# Patient Record
Sex: FEMALE | Race: WHITE
[De-identification: names, ages, dates, MRNs, and addresses within clinical notes are randomized per-mention and may not be internally consistent; named-entity substitution may affect disease eponyms.]

---

## 2017-11-29 ENCOUNTER — TRANSCRIPTION ENCOUNTER (OUTPATIENT)
Age: 82
End: 2017-11-29

## 2017-12-11 ENCOUNTER — EMERGENCY (EMERGENCY)
Facility: HOSPITAL | Age: 82
LOS: 1 days | Discharge: HOME | End: 2017-12-11
Attending: EMERGENCY MEDICINE | Admitting: EMERGENCY MEDICINE
Payer: MEDICARE

## 2017-12-11 VITALS
HEART RATE: 73 BPM | WEIGHT: 108.03 LBS | HEIGHT: 63 IN | DIASTOLIC BLOOD PRESSURE: 73 MMHG | RESPIRATION RATE: 16 BRPM | SYSTOLIC BLOOD PRESSURE: 210 MMHG | OXYGEN SATURATION: 100 % | TEMPERATURE: 99 F

## 2017-12-11 VITALS
DIASTOLIC BLOOD PRESSURE: 56 MMHG | TEMPERATURE: 98 F | HEART RATE: 65 BPM | OXYGEN SATURATION: 100 % | SYSTOLIC BLOOD PRESSURE: 145 MMHG | RESPIRATION RATE: 14 BRPM

## 2017-12-11 DIAGNOSIS — Z95.0 PRESENCE OF CARDIAC PACEMAKER: Chronic | ICD-10-CM

## 2017-12-11 PROCEDURE — 99284 EMERGENCY DEPT VISIT MOD MDM: CPT

## 2017-12-11 PROCEDURE — 99283 EMERGENCY DEPT VISIT LOW MDM: CPT

## 2020-01-08 ENCOUNTER — EMERGENCY (EMERGENCY)
Facility: HOSPITAL | Age: 85
LOS: 0 days | Discharge: HOME | End: 2020-01-08
Attending: EMERGENCY MEDICINE
Payer: MEDICARE

## 2020-01-08 ENCOUNTER — TRANSCRIPTION ENCOUNTER (OUTPATIENT)
Age: 85
End: 2020-01-08

## 2020-01-08 VITALS — SYSTOLIC BLOOD PRESSURE: 156 MMHG | HEART RATE: 74 BPM | DIASTOLIC BLOOD PRESSURE: 67 MMHG

## 2020-01-08 VITALS — HEIGHT: 63 IN | WEIGHT: 108.03 LBS

## 2020-01-08 DIAGNOSIS — R19.7 DIARRHEA, UNSPECIFIED: ICD-10-CM

## 2020-01-08 DIAGNOSIS — I10 ESSENTIAL (PRIMARY) HYPERTENSION: ICD-10-CM

## 2020-01-08 DIAGNOSIS — Z95.0 PRESENCE OF CARDIAC PACEMAKER: Chronic | ICD-10-CM

## 2020-01-08 LAB
ALBUMIN SERPL ELPH-MCNC: 4.2 G/DL — SIGNIFICANT CHANGE UP (ref 3.3–5)
ALP SERPL-CCNC: 63 U/L — SIGNIFICANT CHANGE UP (ref 40–120)
ALT FLD-CCNC: 42 U/L — SIGNIFICANT CHANGE UP (ref 12–78)
ANION GAP SERPL CALC-SCNC: 11 MMOL/L — SIGNIFICANT CHANGE UP (ref 5–17)
APTT BLD: 28.2 SEC — SIGNIFICANT CHANGE UP (ref 27.5–36.3)
AST SERPL-CCNC: 34 U/L — SIGNIFICANT CHANGE UP (ref 15–37)
BASOPHILS # BLD AUTO: 0.04 K/UL — SIGNIFICANT CHANGE UP (ref 0–0.2)
BASOPHILS NFR BLD AUTO: 0.3 % — SIGNIFICANT CHANGE UP (ref 0–2)
BILIRUB SERPL-MCNC: 0.9 MG/DL — SIGNIFICANT CHANGE UP (ref 0.2–1.2)
BUN SERPL-MCNC: 64 MG/DL — HIGH (ref 7–23)
CALCIUM SERPL-MCNC: 9.3 MG/DL — SIGNIFICANT CHANGE UP (ref 8.5–10.1)
CHLORIDE SERPL-SCNC: 107 MMOL/L — SIGNIFICANT CHANGE UP (ref 96–108)
CO2 SERPL-SCNC: 19 MMOL/L — LOW (ref 22–31)
CREAT SERPL-MCNC: 1.89 MG/DL — HIGH (ref 0.5–1.3)
EOSINOPHIL # BLD AUTO: 0.1 K/UL — SIGNIFICANT CHANGE UP (ref 0–0.5)
EOSINOPHIL NFR BLD AUTO: 0.7 % — SIGNIFICANT CHANGE UP (ref 0–6)
GLUCOSE SERPL-MCNC: 171 MG/DL — HIGH (ref 70–99)
HCT VFR BLD CALC: 45.2 % — HIGH (ref 34.5–45)
HGB BLD-MCNC: 14.7 G/DL — SIGNIFICANT CHANGE UP (ref 11.5–15.5)
IMM GRANULOCYTES NFR BLD AUTO: 0.3 % — SIGNIFICANT CHANGE UP (ref 0–1.5)
INR BLD: 1.09 RATIO — SIGNIFICANT CHANGE UP (ref 0.88–1.16)
LIDOCAIN IGE QN: 167 U/L — SIGNIFICANT CHANGE UP (ref 73–393)
LYMPHOCYTES # BLD AUTO: 24.8 % — SIGNIFICANT CHANGE UP (ref 13–44)
LYMPHOCYTES # BLD AUTO: 3.62 K/UL — HIGH (ref 1–3.3)
MCHC RBC-ENTMCNC: 31.1 PG — SIGNIFICANT CHANGE UP (ref 27–34)
MCHC RBC-ENTMCNC: 32.5 GM/DL — SIGNIFICANT CHANGE UP (ref 32–36)
MCV RBC AUTO: 95.6 FL — SIGNIFICANT CHANGE UP (ref 80–100)
MONOCYTES # BLD AUTO: 0.95 K/UL — HIGH (ref 0–0.9)
MONOCYTES NFR BLD AUTO: 6.5 % — SIGNIFICANT CHANGE UP (ref 2–14)
NEUTROPHILS # BLD AUTO: 9.81 K/UL — HIGH (ref 1.8–7.4)
NEUTROPHILS NFR BLD AUTO: 67.4 % — SIGNIFICANT CHANGE UP (ref 43–77)
PLATELET # BLD AUTO: 223 K/UL — SIGNIFICANT CHANGE UP (ref 150–400)
POTASSIUM SERPL-MCNC: 4 MMOL/L — SIGNIFICANT CHANGE UP (ref 3.5–5.3)
POTASSIUM SERPL-SCNC: 4 MMOL/L — SIGNIFICANT CHANGE UP (ref 3.5–5.3)
PROT SERPL-MCNC: 8.9 GM/DL — HIGH (ref 6–8.3)
PROTHROM AB SERPL-ACNC: 12.1 SEC — SIGNIFICANT CHANGE UP (ref 10–12.9)
RBC # BLD: 4.73 M/UL — SIGNIFICANT CHANGE UP (ref 3.8–5.2)
RBC # FLD: 12.6 % — SIGNIFICANT CHANGE UP (ref 10.3–14.5)
SODIUM SERPL-SCNC: 137 MMOL/L — SIGNIFICANT CHANGE UP (ref 135–145)
WBC # BLD: 14.57 K/UL — HIGH (ref 3.8–10.5)
WBC # FLD AUTO: 14.57 K/UL — HIGH (ref 3.8–10.5)

## 2020-01-08 PROCEDURE — 80053 COMPREHEN METABOLIC PANEL: CPT

## 2020-01-08 PROCEDURE — 85610 PROTHROMBIN TIME: CPT

## 2020-01-08 PROCEDURE — 85025 COMPLETE CBC W/AUTO DIFF WBC: CPT

## 2020-01-08 PROCEDURE — 36415 COLL VENOUS BLD VENIPUNCTURE: CPT

## 2020-01-08 PROCEDURE — 83690 ASSAY OF LIPASE: CPT

## 2020-01-08 PROCEDURE — 74176 CT ABD & PELVIS W/O CONTRAST: CPT

## 2020-01-08 PROCEDURE — 99284 EMERGENCY DEPT VISIT MOD MDM: CPT

## 2020-01-08 PROCEDURE — 93005 ELECTROCARDIOGRAM TRACING: CPT

## 2020-01-08 PROCEDURE — 99284 EMERGENCY DEPT VISIT MOD MDM: CPT | Mod: 25

## 2020-01-08 PROCEDURE — 85730 THROMBOPLASTIN TIME PARTIAL: CPT

## 2020-01-08 PROCEDURE — 93010 ELECTROCARDIOGRAM REPORT: CPT

## 2020-01-08 PROCEDURE — 74176 CT ABD & PELVIS W/O CONTRAST: CPT | Mod: 26

## 2020-01-08 RX ORDER — SODIUM CHLORIDE 9 MG/ML
1000 INJECTION INTRAMUSCULAR; INTRAVENOUS; SUBCUTANEOUS ONCE
Refills: 0 | Status: COMPLETED | OUTPATIENT
Start: 2020-01-08 | End: 2020-01-08

## 2020-01-08 RX ORDER — METOPROLOL TARTRATE 50 MG
50 TABLET ORAL ONCE
Refills: 0 | Status: COMPLETED | OUTPATIENT
Start: 2020-01-08 | End: 2020-01-08

## 2020-01-08 RX ORDER — LOSARTAN POTASSIUM 100 MG/1
100 TABLET, FILM COATED ORAL ONCE
Refills: 0 | Status: COMPLETED | OUTPATIENT
Start: 2020-01-08 | End: 2020-01-08

## 2020-01-08 RX ADMIN — LOSARTAN POTASSIUM 100 MILLIGRAM(S): 100 TABLET, FILM COATED ORAL at 16:45

## 2020-01-08 RX ADMIN — Medication 50 MILLIGRAM(S): at 16:44

## 2020-01-08 RX ADMIN — SODIUM CHLORIDE 1000 MILLILITER(S): 9 INJECTION INTRAMUSCULAR; INTRAVENOUS; SUBCUTANEOUS at 16:46

## 2020-01-08 NOTE — ED ADULT TRIAGE NOTE - CHIEF COMPLAINT QUOTE
c/o diarrhea x2day w/ decreased PO intake. denies bad pain, n/v, and fevers at home. sent from  for IV hydration w/ gastroenteritis diagnosis

## 2020-01-08 NOTE — ED STATDOCS - PATIENT PORTAL LINK FT
You can access the FollowMyHealth Patient Portal offered by [facility] by registering at the following website: [email or web address]. By joining [employer]’s FollowMyHealth portal, you will also be able to view your health information using other applications (apps) compatible with our system.

## 2020-01-08 NOTE — ED STATDOCS - NSFOLLOWUPINSTRUCTIONS_ED_ALL_ED_FT
Drink plenty of water to keep yourself hydrated and continue to take your medication for your blood pressure.     Return to the ER for any new or other concerns.      Acute Diarrhea    WHAT YOU NEED TO KNOW:    Acute diarrhea starts quickly and lasts a short time, usually 1 to 3 days. It can last up to 2 weeks. You may not be able to control your diarrhea. Acute diarrhea usually stops on its own.     DISCHARGE INSTRUCTIONS:    Return to the emergency department if:     You feel confused.       Your heartbeat is faster than usual.       Your eyes look deeply sunken, or you have no tears when you cry.       You urinate less than usual, or your urine is dark yellow.       You have blood or mucus in your bowel movements.      You have severe abdominal pain.       You are unable to drink any liquids.     Contact your healthcare provider if:     Your symptoms do not get better with treatment.       You have a fever higher than 101.3°F (38.5°C).       You have trouble eating and drinking because you are vomiting.       Your diarrhea does not get better in 7 days.       You have questions or concerns about your condition or care.     Follow up with your healthcare provider as directed: Write down your questions so you remember to ask them during your visits.     Medicines:    Diarrhea medicine is an over-the-counter medicine that helps slow or stop your diarrhea. Do not take this medicine unless your healthcare provider says it is okay.       Antibiotics may be given to help treat an infection caused by bacteria.       Antiparasitics may be given to treat an infection caused by parasites.       Take your medicine as directed. Contact your healthcare provider if you think your medicine is not helping or if you have side effects. Tell him of her if you are allergic to any medicine. Keep a list of the medicines, vitamins, and herbs you take. Include the amounts, and when and why you take them. Bring the list or the pill bottles to follow-up visits. Carry your medicine list with you in case of an emergency.    Self-care:     Drink liquids as directed. Liquids will help prevent dehydration caused by diarrhea. Ask your healthcare provider how much liquid to drink each day and which liquids are best for you. You may need to drink an oral rehydration solution (ORS). An ORS has the right amounts of water, salts, and sugar you need to replace body fluids. You can buy an ORS at most grocery stores and pharmacies.       Eat foods that are easy to digest. Examples include rice, lentils, cereal, bananas, potatoes, and bread. It also includes some fruits (bananas, melon), well-cooked vegetables, and lean meats. Do not eat foods high in fiber, fat, and sugar. Do not drink alcohol until your diarrhea is gone.     Prevent acute diarrhea:     Wash your hands often. Use soap and water. Wash your hands before you eat or prepare food. Also wash your hands after you use the bathroom. Use an alcohol-based hand gel when soap and water are not available. Handwashing           Keep bathroom surfaces clean. This helps prevent the spread of germs that cause acute diarrhea.       Wash fruits and vegetables well before you eat them. This can help remove germs that cause diarrhea. If possible, remove the skin from fruits and vegetables, or cook them well before you eat them.       Cook meat and poultry as directed. Meat includes beef and pork. Poultry includes chicken, turkey, and duck.  Cook ground meat to 160°F.       Cook ground poultry, whole poultry, or cuts of poultry to at least 165°F. Remove the poultry from heat. Let it stand for 3 minutes before you eat it.       Cook whole cuts of meat other than poultry to at least 145°F. Remove the meat from heat. Let it stand for 3 minutes before you eat it.       Wash dishes that have touched raw meat or poultry with hot water and soap. This includes cutting boards, utensils, dishes, and serving containers.       Place raw or cooked meat or poultry in the refrigerator as soon as possible. Bacteria can grow in meat or poultry that is left at room temperature too long.       Do not eat raw or undercooked oysters, clams, or mussels. These foods may be contaminated and cause infection.       Drink only filtered or treated water when you travel. Do not put ice in your drinks. Drink bottled water whenever possible.

## 2020-01-08 NOTE — ED ADULT NURSE NOTE - NSIMPLEMENTINTERV_GEN_ALL_ED
Implemented All Universal Safety Interventions:  [place] to call system. Call bell, personal items and telephone within reach. Instruct patient to call for assistance. Room bathroom lighting operational. Non-slip footwear when patient is off stretcher. Physically safe environment: no spills, clutter or unnecessary equipment. Stretcher in lowest position, wheels locked, appropriate side rails in place.

## 2020-01-08 NOTE — ED STATDOCS - PROGRESS NOTE DETAILS
89 yo female with a PMH of htn presents with diarrhea and decreased oral intake. Pt was seen a urgent care and sent to the ER for evaluation and hydration. Denies fever, abd pain, n/v, cp, sob, visual changes, decreased urination, headache. Pt with HTN in the intake room. Pt has not taken her medication today. Will given her dose of meds and labs, IVF. -Ilir Alexander PA-C Creatinine slightly elevated. COuld be related to the diarrhea and that pt was not been having much PO intake. BP has improved from the initial reading. Pt feelign better. Discussed results with pt and with care giver. They were made aware that she needs to see her pmd to recheck her Cr and remaining labs. Per caregiver, pt has an appointment with her pmd and will advise the doctor of the plan. Strict return precautions given. -Ilir Alexander PA-C

## 2020-01-08 NOTE — ED STATDOCS - ATTENDING CONTRIBUTION TO CARE
I, Marisol Hood MD, personally saw the patient with ACP.  I have personally performed a face to face diagnostic evaluation on this patient.  I have reviewed the ACP note and agree with the history, exam, and plan of care, except as noted.

## 2020-01-08 NOTE — ED STATDOCS - OBJECTIVE STATEMENT
89 y/o F with a PMHx of HTN presents to the ED c/o diarrhea associated with a decreased PO intake. Pt was seen at  and was sent to ED for IVF with gastroenteritis diagnosis. Denies abd pain, N/V, or fever.

## 2020-01-10 ENCOUNTER — INPATIENT (INPATIENT)
Facility: HOSPITAL | Age: 85
LOS: 4 days | Discharge: HOME HEALTH | DRG: 682 | End: 2020-01-15
Attending: FAMILY MEDICINE | Admitting: FAMILY MEDICINE
Payer: MEDICARE

## 2020-01-10 VITALS — WEIGHT: 100.97 LBS | HEIGHT: 62 IN

## 2020-01-10 DIAGNOSIS — E86.0 DEHYDRATION: ICD-10-CM

## 2020-01-10 DIAGNOSIS — Z95.0 PRESENCE OF CARDIAC PACEMAKER: Chronic | ICD-10-CM

## 2020-01-10 LAB
ALBUMIN SERPL ELPH-MCNC: 3.8 G/DL — SIGNIFICANT CHANGE UP (ref 3.3–5)
ALP SERPL-CCNC: 53 U/L — SIGNIFICANT CHANGE UP (ref 40–120)
ALT FLD-CCNC: 35 U/L — SIGNIFICANT CHANGE UP (ref 12–78)
ANION GAP SERPL CALC-SCNC: 10 MMOL/L — SIGNIFICANT CHANGE UP (ref 5–17)
APPEARANCE UR: ABNORMAL
AST SERPL-CCNC: 30 U/L — SIGNIFICANT CHANGE UP (ref 15–37)
BASOPHILS # BLD AUTO: 0.06 K/UL — SIGNIFICANT CHANGE UP (ref 0–0.2)
BASOPHILS NFR BLD AUTO: 0.3 % — SIGNIFICANT CHANGE UP (ref 0–2)
BILIRUB SERPL-MCNC: 0.8 MG/DL — SIGNIFICANT CHANGE UP (ref 0.2–1.2)
BILIRUB UR-MCNC: ABNORMAL
BUN SERPL-MCNC: 74 MG/DL — HIGH (ref 7–23)
CALCIUM SERPL-MCNC: 8.9 MG/DL — SIGNIFICANT CHANGE UP (ref 8.5–10.1)
CHLORIDE SERPL-SCNC: 111 MMOL/L — HIGH (ref 96–108)
CO2 SERPL-SCNC: 17 MMOL/L — LOW (ref 22–31)
COLOR SPEC: YELLOW — SIGNIFICANT CHANGE UP
CREAT SERPL-MCNC: 2.9 MG/DL — HIGH (ref 0.5–1.3)
DIFF PNL FLD: ABNORMAL
DIGOXIN SERPL-MCNC: 1.08 NG/ML — SIGNIFICANT CHANGE UP (ref 0.8–2)
EOSINOPHIL # BLD AUTO: 0.1 K/UL — SIGNIFICANT CHANGE UP (ref 0–0.5)
EOSINOPHIL NFR BLD AUTO: 0.5 % — SIGNIFICANT CHANGE UP (ref 0–6)
GLUCOSE SERPL-MCNC: 213 MG/DL — HIGH (ref 70–99)
GLUCOSE UR QL: 100 MG/DL
HCT VFR BLD CALC: 41.8 % — SIGNIFICANT CHANGE UP (ref 34.5–45)
HGB BLD-MCNC: 14.1 G/DL — SIGNIFICANT CHANGE UP (ref 11.5–15.5)
IMM GRANULOCYTES NFR BLD AUTO: 0.6 % — SIGNIFICANT CHANGE UP (ref 0–1.5)
KETONES UR-MCNC: ABNORMAL
LEUKOCYTE ESTERASE UR-ACNC: NEGATIVE — SIGNIFICANT CHANGE UP
LYMPHOCYTES # BLD AUTO: 2.07 K/UL — SIGNIFICANT CHANGE UP (ref 1–3.3)
LYMPHOCYTES # BLD AUTO: 9.6 % — LOW (ref 13–44)
MCHC RBC-ENTMCNC: 31.4 PG — SIGNIFICANT CHANGE UP (ref 27–34)
MCHC RBC-ENTMCNC: 33.7 GM/DL — SIGNIFICANT CHANGE UP (ref 32–36)
MCV RBC AUTO: 93.1 FL — SIGNIFICANT CHANGE UP (ref 80–100)
MONOCYTES # BLD AUTO: 1.42 K/UL — HIGH (ref 0–0.9)
MONOCYTES NFR BLD AUTO: 6.6 % — SIGNIFICANT CHANGE UP (ref 2–14)
NEUTROPHILS # BLD AUTO: 17.68 K/UL — HIGH (ref 1.8–7.4)
NEUTROPHILS NFR BLD AUTO: 82.4 % — HIGH (ref 43–77)
NITRITE UR-MCNC: NEGATIVE — SIGNIFICANT CHANGE UP
PH UR: 5 — SIGNIFICANT CHANGE UP (ref 5–8)
PLATELET # BLD AUTO: 217 K/UL — SIGNIFICANT CHANGE UP (ref 150–400)
POTASSIUM SERPL-MCNC: 3.3 MMOL/L — LOW (ref 3.5–5.3)
POTASSIUM SERPL-SCNC: 3.3 MMOL/L — LOW (ref 3.5–5.3)
PROT SERPL-MCNC: 7.9 GM/DL — SIGNIFICANT CHANGE UP (ref 6–8.3)
PROT UR-MCNC: 100 MG/DL
RBC # BLD: 4.49 M/UL — SIGNIFICANT CHANGE UP (ref 3.8–5.2)
RBC # FLD: 12.8 % — SIGNIFICANT CHANGE UP (ref 10.3–14.5)
SODIUM SERPL-SCNC: 138 MMOL/L — SIGNIFICANT CHANGE UP (ref 135–145)
SP GR SPEC: 1.02 — SIGNIFICANT CHANGE UP (ref 1.01–1.02)
UROBILINOGEN FLD QL: NEGATIVE MG/DL — SIGNIFICANT CHANGE UP
WBC # BLD: 21.46 K/UL — HIGH (ref 3.8–10.5)
WBC # FLD AUTO: 21.46 K/UL — HIGH (ref 3.8–10.5)

## 2020-01-10 PROCEDURE — 92610 EVALUATE SWALLOWING FUNCTION: CPT | Mod: GN

## 2020-01-10 PROCEDURE — 82570 ASSAY OF URINE CREATININE: CPT

## 2020-01-10 PROCEDURE — 71045 X-RAY EXAM CHEST 1 VIEW: CPT | Mod: 26

## 2020-01-10 PROCEDURE — 71250 CT THORAX DX C-: CPT | Mod: 26

## 2020-01-10 PROCEDURE — 76770 US EXAM ABDO BACK WALL COMP: CPT

## 2020-01-10 PROCEDURE — 83735 ASSAY OF MAGNESIUM: CPT

## 2020-01-10 PROCEDURE — 80162 ASSAY OF DIGOXIN TOTAL: CPT

## 2020-01-10 PROCEDURE — 84100 ASSAY OF PHOSPHORUS: CPT

## 2020-01-10 PROCEDURE — 84300 ASSAY OF URINE SODIUM: CPT

## 2020-01-10 PROCEDURE — 93010 ELECTROCARDIOGRAM REPORT: CPT

## 2020-01-10 PROCEDURE — 85027 COMPLETE CBC AUTOMATED: CPT

## 2020-01-10 PROCEDURE — 80048 BASIC METABOLIC PNL TOTAL CA: CPT

## 2020-01-10 PROCEDURE — 71250 CT THORAX DX C-: CPT

## 2020-01-10 PROCEDURE — 74176 CT ABD & PELVIS W/O CONTRAST: CPT

## 2020-01-10 PROCEDURE — 76770 US EXAM ABDO BACK WALL COMP: CPT | Mod: 26

## 2020-01-10 PROCEDURE — 36415 COLL VENOUS BLD VENIPUNCTURE: CPT

## 2020-01-10 PROCEDURE — 87507 IADNA-DNA/RNA PROBE TQ 12-25: CPT

## 2020-01-10 PROCEDURE — 84156 ASSAY OF PROTEIN URINE: CPT

## 2020-01-10 RX ORDER — SODIUM CHLORIDE 9 MG/ML
1000 INJECTION INTRAMUSCULAR; INTRAVENOUS; SUBCUTANEOUS ONCE
Refills: 0 | Status: COMPLETED | OUTPATIENT
Start: 2020-01-10 | End: 2020-01-10

## 2020-01-10 RX ORDER — ESCITALOPRAM OXALATE 10 MG/1
10 TABLET, FILM COATED ORAL DAILY
Refills: 0 | Status: DISCONTINUED | OUTPATIENT
Start: 2020-01-10 | End: 2020-01-15

## 2020-01-10 RX ORDER — ONDANSETRON 8 MG/1
4 TABLET, FILM COATED ORAL EVERY 6 HOURS
Refills: 0 | Status: DISCONTINUED | OUTPATIENT
Start: 2020-01-10 | End: 2020-01-15

## 2020-01-10 RX ORDER — CEFTRIAXONE 500 MG/1
1000 INJECTION, POWDER, FOR SOLUTION INTRAMUSCULAR; INTRAVENOUS ONCE
Refills: 0 | Status: COMPLETED | OUTPATIENT
Start: 2020-01-10 | End: 2020-01-10

## 2020-01-10 RX ORDER — POTASSIUM CHLORIDE 20 MEQ
40 PACKET (EA) ORAL ONCE
Refills: 0 | Status: COMPLETED | OUTPATIENT
Start: 2020-01-10 | End: 2020-01-10

## 2020-01-10 RX ORDER — AZITHROMYCIN 500 MG/1
500 TABLET, FILM COATED ORAL ONCE
Refills: 0 | Status: COMPLETED | OUTPATIENT
Start: 2020-01-10 | End: 2020-01-10

## 2020-01-10 RX ORDER — HEPARIN SODIUM 5000 [USP'U]/ML
5000 INJECTION INTRAVENOUS; SUBCUTANEOUS EVERY 12 HOURS
Refills: 0 | Status: DISCONTINUED | OUTPATIENT
Start: 2020-01-10 | End: 2020-01-15

## 2020-01-10 RX ORDER — ATORVASTATIN CALCIUM 80 MG/1
10 TABLET, FILM COATED ORAL AT BEDTIME
Refills: 0 | Status: DISCONTINUED | OUTPATIENT
Start: 2020-01-10 | End: 2020-01-15

## 2020-01-10 RX ORDER — METOPROLOL TARTRATE 50 MG
1 TABLET ORAL
Qty: 0 | Refills: 0 | DISCHARGE

## 2020-01-10 RX ORDER — METOPROLOL TARTRATE 50 MG
100 TABLET ORAL DAILY
Refills: 0 | Status: DISCONTINUED | OUTPATIENT
Start: 2020-01-10 | End: 2020-01-15

## 2020-01-10 RX ORDER — MIRTAZAPINE 45 MG/1
15 TABLET, ORALLY DISINTEGRATING ORAL AT BEDTIME
Refills: 0 | Status: DISCONTINUED | OUTPATIENT
Start: 2020-01-10 | End: 2020-01-15

## 2020-01-10 RX ORDER — SODIUM CHLORIDE 9 MG/ML
1000 INJECTION INTRAMUSCULAR; INTRAVENOUS; SUBCUTANEOUS
Refills: 0 | Status: DISCONTINUED | OUTPATIENT
Start: 2020-01-10 | End: 2020-01-12

## 2020-01-10 RX ORDER — ACETAMINOPHEN 500 MG
650 TABLET ORAL EVERY 6 HOURS
Refills: 0 | Status: DISCONTINUED | OUTPATIENT
Start: 2020-01-10 | End: 2020-01-15

## 2020-01-10 RX ADMIN — AZITHROMYCIN 255 MILLIGRAM(S): 500 TABLET, FILM COATED ORAL at 11:19

## 2020-01-10 RX ADMIN — MIRTAZAPINE 15 MILLIGRAM(S): 45 TABLET, ORALLY DISINTEGRATING ORAL at 23:59

## 2020-01-10 RX ADMIN — Medication 0.1 MILLIGRAM(S): at 23:59

## 2020-01-10 RX ADMIN — Medication 40 MILLIEQUIVALENT(S): at 21:40

## 2020-01-10 RX ADMIN — SODIUM CHLORIDE 1000 MILLILITER(S): 9 INJECTION INTRAMUSCULAR; INTRAVENOUS; SUBCUTANEOUS at 11:00

## 2020-01-10 RX ADMIN — SODIUM CHLORIDE 1000 MILLILITER(S): 9 INJECTION INTRAMUSCULAR; INTRAVENOUS; SUBCUTANEOUS at 08:49

## 2020-01-10 RX ADMIN — ATORVASTATIN CALCIUM 10 MILLIGRAM(S): 80 TABLET, FILM COATED ORAL at 23:59

## 2020-01-10 RX ADMIN — CEFTRIAXONE 1000 MILLIGRAM(S): 500 INJECTION, POWDER, FOR SOLUTION INTRAMUSCULAR; INTRAVENOUS at 11:18

## 2020-01-10 RX ADMIN — SODIUM CHLORIDE 1000 MILLILITER(S): 9 INJECTION INTRAMUSCULAR; INTRAVENOUS; SUBCUTANEOUS at 10:45

## 2020-01-10 RX ADMIN — SODIUM CHLORIDE 80 MILLILITER(S): 9 INJECTION INTRAMUSCULAR; INTRAVENOUS; SUBCUTANEOUS at 20:24

## 2020-01-10 RX ADMIN — AZITHROMYCIN 500 MILLIGRAM(S): 500 TABLET, FILM COATED ORAL at 11:50

## 2020-01-10 RX ADMIN — SODIUM CHLORIDE 1000 MILLILITER(S): 9 INJECTION INTRAMUSCULAR; INTRAVENOUS; SUBCUTANEOUS at 12:30

## 2020-01-10 NOTE — ED ADULT NURSE NOTE - CHIEF COMPLAINT QUOTE
was seen in [facility] 2 days ago for diarrhea, states symptoms improving but has lose stool per family. c/o decreased PO intake and generalized weakness with dizziness. Denies CP or SOB.

## 2020-01-10 NOTE — ED PROVIDER NOTE - CONSTITUTIONAL, MLM
normal... thin chronically ill appearing, awake, alert, oriented to person, place, time/situation and in mild apparent distress.

## 2020-01-10 NOTE — ED PROVIDER NOTE - CLINICAL SUMMARY MEDICAL DECISION MAKING FREE TEXT BOX
89 yo female with dehydration, lethargy for 3 days. plan for ivfs, labs, ua, cxr. reEval. possible admit for continued hydration.

## 2020-01-10 NOTE — ED PROVIDER NOTE - OBJECTIVE STATEMENT
89 yo female from home with pmhx dementia, PPM,  BIB caretaker fro dec po, lethargy . was seen in ED 2 days ago for similar, also had diarrhea at that time. diarrhea started Monday, resolved yesterday ecept for 1 loose BM this am. no fevers. no abd pain,. no cp. chronic cough.

## 2020-01-10 NOTE — H&P ADULT - HISTORY OF PRESENT ILLNESS
CC:  dehydration, weakness and diarrhea.    HPI:  89F.  hx of dementia.  lives in her own home w/ 24/7 A.  HHA reports patient was in her usual state of health until 01/06, onset of watery diarrhea.  became increasingly fatigued and dehydrated.  was brought to  and referred to  ED on 01/08 for IVFs and discharged home.  patient's condition did not improve over the following 24 hours and taken back to ED today.    denied recent ABx use or hospitalization over the past 90 days.  no known sick contacts.    in ED, given 2L NS.  UA, no pyuria.  UCx obtained by ED.  CXR, chest CT, AB/pelvis CT unremarkable.  Cr 2.9.  digoxin, losartan and HCTZ listed among patient's medications.    ROS:  (+) cough (chronic), rhinorrhea.  (-) SOB, dysuria.    PAST MEDICAL & SURGICAL HISTORY:  HTN (hypertension)  Artificial pacemaker    Allergies    No Known Allergies    Intolerances    Home Medications:  atorvastatin 10 mg oral tablet: 1 tab(s) orally once a day (10 Naresh 2020 12:57)  B Complex 50 oral tablet, extended release: 1 tab(s) orally once a day (10 Naresh 2020 15:33)  cloNIDine 0.1 mg oral tablet: 1 tab(s) orally 3 times a day (10 Naresh 2020 15:33)  digoxin 125 mcg (0.125 mg) oral tablet: 1 tab(s) orally once a day (10 Naresh 2020 12:57)  escitalopram 10 mg oral tablet: 1 tab(s) orally once a day (10 Naresh 2020 15:33)  hydroCHLOROthiazide 25 mg oral tablet: 1 tab(s) orally every other day (10 Naresh 2020 15:33)  losartan 100 mg oral tablet: 1 tab(s) orally once a day (10 Naresh 2020 12:57)  metoprolol succinate 100 mg oral tablet, extended release: 1 tab(s) orally once a day (10 Naresh 2020 15:33)  mirtazapine 15 mg oral tablet: 1 tab(s) orally once a day (at bedtime) (10 Naresh 2020 15:33)  Multiple Vitamins oral tablet: 1 tab(s) orally once a day (10 Naresh 2020 15:33)    Vital Signs Last 24 Hrs  T(C): 36.8 (10 Naresh 2020 15:47), Max: 37.3 (10 Naresh 2020 10:45)  T(F): 98.3 (10 Naresh 2020 15:47), Max: 99.2 (10 Naresh 2020 10:45)  HR: 75 (10 Naresh 2020 15:47) (72 - 83)  BP: 149/60 (10 Naresh 2020 15:47) (107/41 - 149/60)  BP(mean): 89 (10 Naresh 2020 07:29) (89 - 89)  RR: 18 (10 Naresh 2020 15:47) (17 - 18)  SpO2: 98% (10 Naresh 2020 15:47) (94% - 98%)    Constitutional: NAD.   HEENT: PERRL, EOMI, MMM.  Neck: Soft and supple, No carotid bruit, No JVD  Respiratory: Breath sounds are clear bilaterally, No wheezing, rales or rhonchi  Cardiovascular: S1 and S2, regular rate and rhythm, no murmur, rub or gallop.  Gastrointestinal: Bowel Sounds present, soft, nontender, nondistended, no guarding, no rebound, no mass.  Extremities: No peripheral edema  Vascular: 2+ peripheral pulses  Neurological: A/O x , no focal deficits  Musculoskeletal: 5/5 strength b/l upper and lower extremities  Skin:  no visible rashes.                           14.1   21.46 )-----------( 217      ( 10 Naresh 2020 08:50 )             41.8     01-10    138  |  111<H>  |  74<H>  ----------------------------<  213<H>  3.3<L>   |  17<L>  |  2.90<H>    Ca    8.9      10 Naresh 2020 08:50    TPro  7.9  /  Alb  3.8  /  TBili  0.8  /  DBili  x   /  AST  30  /  ALT  35  /  AlkPhos  53  01-10    Urine Microscopic-Add On (NC) (01.10.20 @ 08:51)    Red Blood Cell - Urine: 0-2 /HPF    White Blood Cell - Urine: Negative    Comment - Urine: amorphous    < from: CT Chest No Cont (01.10.20 @ 15:03) >  IMPRESSION:     Fibrotic strands in both apices. Mild dependent atelectasis at both lung bases. No evidence of pulmonary infiltrate or consolidation.    < end of copied text >    < from: CT Abdomen and Pelvis No Cont (01.08.20 @ 17:25) >    IMPRESSION: No acute abdominal pathology within the limits of this noncontrast study. Semiformed stool in the colon..    < end of copied text >    < from: 12 Lead ECG (01.08.20 @ 16:06) >    Diagnosis Line Sinus rhythm with 1st degree A-V block  Inferior infarct , age undetermined  ST & T wave abnormality, consider lateral ischemia  Abnormal ECG  No previous ECGs available  Confirmed by Hussein OSEGUERA, Lisandro ([id number]) on 1/8/2020 5:23:49 PM    < end of copied text > CC:  dehydration, weakness and diarrhea.    HPI:  89F.  hx of dementia.  lives in her own home w/ 24/7 A.  HHA reports patient was in her usual state of health until 01/06, onset of watery diarrhea.  became increasingly fatigued and dehydrated.  was brought to  and referred to  ED on 01/08 for IVF, dx'd w/ gastroenteritis and discharged home.  patient's condition did not improve over the following 24 hours and taken back to ED today.    denied recent ABx use or hospitalization over the past 90 days.  no known sick contacts.    in ED, given 2L NS.  UA, no pyuria.  UCx obtained by ED.  CXR, chest CT, AB/pelvis CT unremarkable.  Cr 2.9.  digoxin, losartan and HCTZ listed among patient's medications.    ROS:  (+) cough (chronic), rhinorrhea.  (-) SOB, dysuria.    PAST MEDICAL & SURGICAL HISTORY:  HTN (hypertension)  Artificial pacemaker    Allergies    No Known Allergies    Intolerances    Home Medications:  atorvastatin 10 mg oral tablet: 1 tab(s) orally once a day (10 Naresh 2020 12:57)  B Complex 50 oral tablet, extended release: 1 tab(s) orally once a day (10 Naresh 2020 15:33)  cloNIDine 0.1 mg oral tablet: 1 tab(s) orally 3 times a day (10 Naresh 2020 15:33)  digoxin 125 mcg (0.125 mg) oral tablet: 1 tab(s) orally once a day (10 Naresh 2020 12:57)  escitalopram 10 mg oral tablet: 1 tab(s) orally once a day (10 Naresh 2020 15:33)  hydroCHLOROthiazide 25 mg oral tablet: 1 tab(s) orally every other day (10 Naresh 2020 15:33)  losartan 100 mg oral tablet: 1 tab(s) orally once a day (10 Naresh 2020 12:57)  metoprolol succinate 100 mg oral tablet, extended release: 1 tab(s) orally once a day (10 Naresh 2020 15:33)  mirtazapine 15 mg oral tablet: 1 tab(s) orally once a day (at bedtime) (10 Naersh 2020 15:33)  Multiple Vitamins oral tablet: 1 tab(s) orally once a day (10 Naresh 2020 15:33)    Vital Signs Last 24 Hrs  T(C): 36.8 (10 Naresh 2020 15:47), Max: 37.3 (10 Naresh 2020 10:45)  T(F): 98.3 (10 Naresh 2020 15:47), Max: 99.2 (10 Naresh 2020 10:45)  HR: 75 (10 Naresh 2020 15:47) (72 - 83)  BP: 149/60 (10 Naresh 2020 15:47) (107/41 - 149/60)  BP(mean): 89 (10 Naresh 2020 07:29) (89 - 89)  RR: 18 (10 Naresh 2020 15:47) (17 - 18)  SpO2: 98% (10 Naresh 2020 15:47) (94% - 98%)    Constitutional: NAD.   HEENT: PERRL, EOMI, MMM.  Neck: Soft and supple, No carotid bruit, No JVD  Respiratory: Breath sounds are clear bilaterally, No wheezing, rales or rhonchi  Cardiovascular: S1 and S2, regular rate and rhythm, no murmur, rub or gallop.  Gastrointestinal: Bowel Sounds present, soft, nontender, nondistended, no guarding, no rebound, no mass.  Extremities: No peripheral edema  Vascular: 2+ peripheral pulses  Neurological: A/O x 2, no focal deficits  Musculoskeletal: 5/5 strength b/l upper and lower extremities  Skin:  no visible rashes.                         14.1   21.46 )-----------( 217      ( 10 Naresh 2020 08:50 )             41.8     01-10    138  |  111<H>  |  74<H>  ----------------------------<  213<H>  3.3<L>   |  17<L>  |  2.90<H>    Ca    8.9      10 Naresh 2020 08:50    TPro  7.9  /  Alb  3.8  /  TBili  0.8  /  DBili  x   /  AST  30  /  ALT  35  /  AlkPhos  53  01-10    Urine Microscopic-Add On (NC) (01.10.20 @ 08:51)    Red Blood Cell - Urine: 0-2 /HPF    White Blood Cell - Urine: Negative    Comment - Urine: amorphous    < from: CT Chest No Cont (01.10.20 @ 15:03) >  IMPRESSION:     Fibrotic strands in both apices. Mild dependent atelectasis at both lung bases. No evidence of pulmonary infiltrate or consolidation.    < end of copied text >    < from: CT Abdomen and Pelvis No Cont (01.08.20 @ 17:25) >    IMPRESSION: No acute abdominal pathology within the limits of this noncontrast study. Semiformed stool in the colon..    < end of copied text >    < from: 12 Lead ECG (01.08.20 @ 16:06) >    Diagnosis Line Sinus rhythm with 1st degree A-V block  Inferior infarct , age undetermined  ST & T wave abnormality, consider lateral ischemia  Abnormal ECG  No previous ECGs available  Confirmed by Lisandro Savage MD ([id number]) on 1/8/2020 5:23:49 PM    < end of copied text >

## 2020-01-10 NOTE — H&P ADULT - ASSESSMENT
89F.  hx dementia.  presented to ED c/o 5 day hx of dehydration, weakness and diarrhea.    1.	diarrhea, r/o viral v. bacterial enteritis.  stool Cx, O&P and C. dif PCR.  2.	ERICA.  prerenal azotemia/dehydration.  monitor urine output.  trend BMP.  UA bland.  US kidney and bladder.  IVFs.  DC digoxin, losartan and HCTZ.  stat digoxin level and place on telemetry monitor until resulted.  Nephrology consult.    3.	leukocytosis.  suspect hemoconcentrated, r/o C. dif.  afebrile.  no pyuria.  CXR/chest CT, no pneumonia.  CT AB/pelvis, no infectious findings.  symptoms not c/w influenza or RV.  trend CBC.  4.	hypokalemia.  monitor/supplement.  5.	debility/weakness, PT evaluation.  6.	DVT prophylaxis, UFH sq.  7.	disposition, telemetry monitoring pending digoxin level.  8.	communication, RN, JENNIFER and patient's daughter.

## 2020-01-10 NOTE — ED ADULT TRIAGE NOTE - CHIEF COMPLAINT QUOTE
was seen in [facility] 2 days ago for diarrhea, states symptoms improving but has lose stool per family. c/o decreased PO intake and generalized weakness with dizziness. Denies CP or SOB.

## 2020-01-10 NOTE — ED ADULT NURSE NOTE - NSIMPLEMENTINTERV_GEN_ALL_ED
Implemented All Fall Risk Interventions:  [place] to call system. Call bell, personal items and telephone within reach. Instruct patient to call for assistance. Room bathroom lighting operational. Non-slip footwear when patient is off stretcher. Physically safe environment: no spills, clutter or unnecessary equipment. Stretcher in lowest position, wheels locked, appropriate side rails in place. Provide visual cue, wrist band, yellow gown, etc. Monitor gait and stability. Monitor for mental status changes and reorient to person, place, and time. Review medications for side effects contributing to fall risk. Reinforce activity limits and safety measures with patient and family.

## 2020-01-10 NOTE — ED ADULT NURSE NOTE - OBJECTIVE STATEMENT
Pt to triage, A&O x3, c/o weakness.  Pts caregiver at bedside reports pt was seen in ED 2 days ago for diarrhea, reports diarrhea has subsided since then expect for one loose stool this morning but pt is endorsing weakness, decreased PO intake and fatigue.  Pt denies SOB, chest pain, N/V.  Hx of pacemaker, dementia.  Pt is poor historian due to baseline dementia.

## 2020-01-11 LAB
ANION GAP SERPL CALC-SCNC: 7 MMOL/L — SIGNIFICANT CHANGE UP (ref 5–17)
BUN SERPL-MCNC: 50 MG/DL — HIGH (ref 7–23)
C DIFF BY PCR RESULT: SIGNIFICANT CHANGE UP
C DIFF TOX GENS STL QL NAA+PROBE: SIGNIFICANT CHANGE UP
CALCIUM SERPL-MCNC: 7.8 MG/DL — LOW (ref 8.5–10.1)
CHLORIDE SERPL-SCNC: 121 MMOL/L — HIGH (ref 96–108)
CO2 SERPL-SCNC: 18 MMOL/L — LOW (ref 22–31)
CREAT ?TM UR-MCNC: 80 MG/DL — SIGNIFICANT CHANGE UP
CREAT SERPL-MCNC: 1.39 MG/DL — HIGH (ref 0.5–1.3)
CULTURE RESULTS: NO GROWTH — SIGNIFICANT CHANGE UP
GLUCOSE SERPL-MCNC: 138 MG/DL — HIGH (ref 70–99)
HCT VFR BLD CALC: 33.9 % — LOW (ref 34.5–45)
HGB BLD-MCNC: 11.3 G/DL — LOW (ref 11.5–15.5)
MCHC RBC-ENTMCNC: 31.4 PG — SIGNIFICANT CHANGE UP (ref 27–34)
MCHC RBC-ENTMCNC: 33.3 GM/DL — SIGNIFICANT CHANGE UP (ref 32–36)
MCV RBC AUTO: 94.2 FL — SIGNIFICANT CHANGE UP (ref 80–100)
PLATELET # BLD AUTO: 142 K/UL — LOW (ref 150–400)
POTASSIUM SERPL-MCNC: 3.5 MMOL/L — SIGNIFICANT CHANGE UP (ref 3.5–5.3)
POTASSIUM SERPL-SCNC: 3.5 MMOL/L — SIGNIFICANT CHANGE UP (ref 3.5–5.3)
PROT ?TM UR-MCNC: 28 MG/DL — HIGH (ref 0–12)
PROT/CREAT UR-RTO: 0.4 RATIO — HIGH (ref 0–0.2)
RBC # BLD: 3.6 M/UL — LOW (ref 3.8–5.2)
RBC # FLD: 12.8 % — SIGNIFICANT CHANGE UP (ref 10.3–14.5)
SODIUM SERPL-SCNC: 146 MMOL/L — HIGH (ref 135–145)
SPECIMEN SOURCE: SIGNIFICANT CHANGE UP
WBC # BLD: 8.91 K/UL — SIGNIFICANT CHANGE UP (ref 3.8–10.5)
WBC # FLD AUTO: 8.91 K/UL — SIGNIFICANT CHANGE UP (ref 3.8–10.5)

## 2020-01-11 PROCEDURE — 74176 CT ABD & PELVIS W/O CONTRAST: CPT | Mod: 26

## 2020-01-11 RX ORDER — CEFTRIAXONE 500 MG/1
INJECTION, POWDER, FOR SOLUTION INTRAMUSCULAR; INTRAVENOUS
Refills: 0 | Status: DISCONTINUED | OUTPATIENT
Start: 2020-01-11 | End: 2020-01-15

## 2020-01-11 RX ORDER — CEFTRIAXONE 500 MG/1
INJECTION, POWDER, FOR SOLUTION INTRAMUSCULAR; INTRAVENOUS
Refills: 0 | Status: DISCONTINUED | OUTPATIENT
Start: 2020-01-11 | End: 2020-01-11

## 2020-01-11 RX ORDER — CEFTRIAXONE 500 MG/1
1000 INJECTION, POWDER, FOR SOLUTION INTRAMUSCULAR; INTRAVENOUS EVERY 24 HOURS
Refills: 0 | Status: DISCONTINUED | OUTPATIENT
Start: 2020-01-12 | End: 2020-01-15

## 2020-01-11 RX ORDER — CEFTRIAXONE 500 MG/1
1000 INJECTION, POWDER, FOR SOLUTION INTRAMUSCULAR; INTRAVENOUS ONCE
Refills: 0 | Status: COMPLETED | OUTPATIENT
Start: 2020-01-11 | End: 2020-01-11

## 2020-01-11 RX ADMIN — HEPARIN SODIUM 5000 UNIT(S): 5000 INJECTION INTRAVENOUS; SUBCUTANEOUS at 18:29

## 2020-01-11 RX ADMIN — HEPARIN SODIUM 5000 UNIT(S): 5000 INJECTION INTRAVENOUS; SUBCUTANEOUS at 05:33

## 2020-01-11 RX ADMIN — MIRTAZAPINE 15 MILLIGRAM(S): 45 TABLET, ORALLY DISINTEGRATING ORAL at 21:35

## 2020-01-11 RX ADMIN — Medication 1 TABLET(S): at 12:20

## 2020-01-11 RX ADMIN — Medication 100 MILLIGRAM(S): at 05:33

## 2020-01-11 RX ADMIN — ATORVASTATIN CALCIUM 10 MILLIGRAM(S): 80 TABLET, FILM COATED ORAL at 21:38

## 2020-01-11 RX ADMIN — Medication 0.1 MILLIGRAM(S): at 05:33

## 2020-01-11 RX ADMIN — SODIUM CHLORIDE 80 MILLILITER(S): 9 INJECTION INTRAMUSCULAR; INTRAVENOUS; SUBCUTANEOUS at 07:00

## 2020-01-11 RX ADMIN — ESCITALOPRAM OXALATE 10 MILLIGRAM(S): 10 TABLET, FILM COATED ORAL at 12:20

## 2020-01-11 RX ADMIN — CEFTRIAXONE 1000 MILLIGRAM(S): 500 INJECTION, POWDER, FOR SOLUTION INTRAMUSCULAR; INTRAVENOUS at 13:50

## 2020-01-11 RX ADMIN — Medication 0.1 MILLIGRAM(S): at 13:50

## 2020-01-11 RX ADMIN — Medication 0.1 MILLIGRAM(S): at 21:36

## 2020-01-11 NOTE — CONSULT NOTE ADULT - SUBJECTIVE AND OBJECTIVE BOX
HPI:  89F.  hx of dementia.  lives in her own home w/  A.  HHA reports patient was in her usual state of health until , onset of watery diarrhea.  became increasingly fatigued and dehydrated.  Brought to  and referred to  ED on  for IVF, dx'd w/ gastroenteritis and discharged home.  patient's condition did not improve over the following 24 hours and taken back to ED and 1.89 and then upon return Cr is 2.9   In ED, given 2L NS.  and aid states she generally does not drink much fluids and takes losartan and HCTZ at home    today    aide at bedside   pt is not eating much but is somewhat improved   no further diarrhea    PAST MEDICAL & SURGICAL HISTORY:  HTN (hypertension)  Artificial pacemaker    Home Medications:  atorvastatin 10 mg oral tablet: 1 tab(s) orally once a day (10 Naresh 2020 12:57)  B Complex 50 oral tablet, extended release: 1 tab(s) orally once a day (10 Naresh 2020 15:33)  cloNIDine 0.1 mg oral tablet: 1 tab(s) orally 3 times a day (10 Naresh 2020 15:33)  digoxin 125 mcg (0.125 mg) oral tablet: 1 tab(s) orally once a day (10 Naresh 2020 12:57)  escitalopram 10 mg oral tablet: 1 tab(s) orally once a day (10 Naresh 2020 15:33)  hydroCHLOROthiazide 25 mg oral tablet: 1 tab(s) orally every other day (10 Naresh 2020 15:33)  losartan 100 mg oral tablet: 1 tab(s) orally once a day (10 Naresh 2020 12:57)  metoprolol succinate 100 mg oral tablet, extended release: 1 tab(s) orally once a day (10 Naresh 2020 15:33)  mirtazapine 15 mg oral tablet: 1 tab(s) orally once a day (at bedtime) (10 Naresh 2020 15:33)  Multiple Vitamins oral tablet: 1 tab(s) orally once a day (10 Naresh 2020 15:33)    MEDICATIONS  (STANDING):  atorvastatin 10 milliGRAM(s) Oral at bedtime  cefTRIAXone Injectable.      cloNIDine 0.1 milliGRAM(s) Oral three times a day  escitalopram 10 milliGRAM(s) Oral daily  heparin  Injectable 5000 Unit(s) SubCutaneous every 12 hours  metoprolol succinate  milliGRAM(s) Oral daily  mirtazapine 15 milliGRAM(s) Oral at bedtime  multivitamin 1 Tablet(s) Oral daily  sodium chloride 0.9%. 1000 milliLiter(s) (80 mL/Hr) IV Continuous <Continuous>      Allergies    No Known Allergies    Intolerances      SOCIAL HISTORY:  Denies ETOh,Smoking,     FAMILY HISTORY:      REVIEW OF SYSTEMS:    UTO due to MS      Vital Signs Last 24 Hrs  T(C): 37.1 (2020 18:07), Max: 37.4 (10 Naresh 2020 22:32)  T(F): 98.8 (2020 18:07), Max: 99.3 (10 Naresh 2020 22:32)  HR: 70 (2020 18:07) (69 - 83)  BP: 154/63 (2020 18:07) (119/36 - 176/59)  BP(mean): --  RR: 18 (2020 18:07) (16 - 18)  SpO2: 99% (2020 18:07) (97% - 99%)      I&O's Detail    2020 07:01  -  2020 18:37  --------------------------------------------------------  IN:    Oral Fluid: 360 mL    sodium chloride 0.9%.: 600 mL  Total IN: 960 mL    OUT:  Total OUT: 0 mL    Total NET: 960 mL      PHYSICAL EXAM:    Constitutional: frail confused   HEENT: , EOMI,  MMM  Neck: No LAD, No JVD  Respiratory: CTAB  Cardiovascular: S1 and S2  Gastrointestinal: BS+, soft, NT/ND  Extremities: No peripheral edema  Neurological: Alert confuse   : No Servin  Skin: No rashes  Access: Not applicable    LABS:               146    |  121    |  50     ----------------------------<  138       2020 08:51  3.5     |  18     |  1.39     138    |  111    |  74     ----------------------------<  213       10 Naresh 2020 08:50  3.3     |  17     |  2.90     137    |  107    |  64     ----------------------------<  171       2020 16:01  4.0     |  19     |  1.89     Ca    7.8        2020 08:51  Ca    8.9        10 Naresh 2020 08:50        TPro  7.9    /  Alb  3.8    /  TBili  0.8    /        10 Naresh 2020 08:50  DBili  x      /  AST  30     /  ALT  35     /  AlkPhos  53       TPro  8.9    /  Alb  4.2    /  TBili  0.9    /        2020 16:01  DBili  x      /  AST  34     /  ALT  42     /  AlkPhos  63                                 11.3   8.91  )-----------( 142      ( 2020 08:51 )             33.9                         14.1   21.46 )-----------( 217      ( 10 Naresh 2020 08:50 )             41.8     Urine Studies:  Urinalysis Basic - ( 10 Naresh 2020 08:51 )    Color: Yellow / Appearance: Slightly Turbid / S.020 / pH: x  Gluc: x / Ketone: Trace  / Bili: Small / Urobili: Negative mg/dL   Blood: x / Protein: 100 mg/dL / Nitrite: Negative   Leuk Esterase: Negative / RBC: 0-2 /HPF / WBC Negative   Sq Epi: x / Non Sq Epi: x / Bacteria: x            RADIOLOGY & ADDITIONAL STUDIES:    PROCEDURE DATE:  01/10/2020          INTERPRETATION:  CLINICAL INFORMATION: Acute kidney injury    COMPARISON: CT abdomen pelvis dated 2020    TECHNIQUE: Sonography of the kidneys and bladder.     FINDINGS:    Right kidney:  8.6 cm. No hydronephrosis.    Left kidney:  9.3 cm. No hydronephrosis. Nonobstructing 5 mm calculus in the lower pole.    Urinary bladder: Underdistended. Small amount of layering debris within the urinary bladder.    IMPRESSION:     No hydronephrosis. 5 mm nonobstructing calculus in the lower pole of the left kidney.    Underdistended urinary bladder with small amount of layering debris.                .

## 2020-01-11 NOTE — CONSULT NOTE ADULT - ASSESSMENT
89 yo female with hx of dementia, htn and preesnting w diarrhea and hx of poor po intake possible gastroenteritis    w ERICA     ERICA - prerenal - w diarrhea while on HCTZ and ACE   Cr improving   continue IVF hydration    urine lytes   ua - bland sediment    renal sono - smaller kidneys expected - no hydro    trend Cr    continue to hold HCTZ and ACE   may use alternative BP meds if needed - ie amlodipine or hydralazine if needed      Thank you for the courtesy of this consult. We will follow this patient with you.   Management is subject to change if new information becomes available or patient condition changes. 89 yo female with hx of dementia, htn and preesnting w diarrhea and hx of poor po intake possible gastroenteritis    w ERICA     ERICA - prerenal - w diarrhea while on HCTZ and ACE   Cr improving   continue IVF hydration    urine lytes   ua - bland sediment    renal sono - smaller kidneys expected - no hydro    trend Cr    continue to hold HCTZ and ACE   may use alternative BP meds if needed - ie amlodipine or hydralazine if needed     NAG Met Acidosis sec to GI losses    improving - monitor      Thank you for the courtesy of this consult. We will follow this patient with you.   Management is subject to change if new information becomes available or patient condition changes.

## 2020-01-11 NOTE — PROGRESS NOTE ADULT - SUBJECTIVE AND OBJECTIVE BOX
CHIEF COMPLAINT/Diagnosis: Diahrea    SUBJECTIVE: weakness    REVIEW OF SYSTEMS:    CONSTITUTIONAL: No weakness, fevers or chills  EYES/ENT: No visual changes;  No vertigo or throat pain   NECK: No pain or stiffness  RESPIRATORY: No cough, wheezing, hemoptysis; No shortness of breath  CARDIOVASCULAR: No chest pain or palpitations  GASTROINTESTINAL: No abdominal or epigastric pain. No nausea, vomiting, or hematemesis; No diarrhea or constipation. No melena or hematochezia.  GENITOURINARY: No dysuria, frequency or hematuria  NEUROLOGICAL: No numbness or weakness  SKIN: No itching, burning, rashes, or lesions   All other review of systems is negative unless indicated above    Vital Signs Last 24 Hrs  T(C): 36.9 (11 Jan 2020 05:20), Max: 37.4 (10 Naresh 2020 22:32)  T(F): 98.5 (11 Jan 2020 05:20), Max: 99.3 (10 Naresh 2020 22:32)  HR: 70 (11 Jan 2020 05:20) (69 - 83)  BP: 176/59 (11 Jan 2020 05:20) (107/41 - 176/59)  BP(mean): --  RR: 18 (11 Jan 2020 05:20) (16 - 18)  SpO2: 97% (11 Jan 2020 05:20) (94% - 98%)    I&O's Summary      CAPILLARY BLOOD GLUCOSE          PHYSICAL EXAM:    Constitutional: NAD, awake and alert, well-developed  HEENT: PERR, EOMI, Normal Hearing, MMM  Neck: Soft and supple, No LAD, No JVD  Respiratory: Breath sounds are clear bilaterally, No wheezing, rales or rhonchi  Cardiovascular: S1 and S2, regular rate and rhythm, no Murmurs, gallops or rubs  Gastrointestinal: Bowel Sounds present, soft, nontender, nondistended, no guarding, no rebound  Extremities: No peripheral edema  Vascular: 2+ peripheral pulses  Neurological: A/O x 3, no focal deficits  Musculoskeletal: 5/5 strength b/l upper and lower extremities  Skin: No rashes    MEDICATIONS:  MEDICATIONS  (STANDING):  atorvastatin 10 milliGRAM(s) Oral at bedtime  cloNIDine 0.1 milliGRAM(s) Oral three times a day  escitalopram 10 milliGRAM(s) Oral daily  heparin  Injectable 5000 Unit(s) SubCutaneous every 12 hours  metoprolol succinate  milliGRAM(s) Oral daily  mirtazapine 15 milliGRAM(s) Oral at bedtime  multivitamin 1 Tablet(s) Oral daily  sodium chloride 0.9%. 1000 milliLiter(s) (80 mL/Hr) IV Continuous <Continuous>      LABS: All Labs Reviewed:                        14.1   21.46 )-----------( 217      ( 10 Naresh 2020 08:50 )             41.8     01-10    138  |  111<H>  |  74<H>  ----------------------------<  213<H>  3.3<L>   |  17<L>  |  2.90<H>    Ca    8.9      10 Naresh 2020 08:50    TPro  7.9  /  Alb  3.8  /  TBili  0.8  /  DBili  x   /  AST  30  /  ALT  35  /  AlkPhos  53  01-10          Assessment and Plan:     89F.  hx dementia.  presented to ED c/o 5 day hx of dehydration, weakness and diarrhea.    1-diarrhea,   --r/o viral v. bacterial enteritis.  stool Cx, O&P and C. dif PCR.    2-ERICA.  prerenal azotemia/dehydration.  monitor urine output.  trend BMP.  UA bland.  US kidney and bladder.  IVFs.  DC digoxin, losartan and HCTZ.  stat digoxin level and place on telemetry monitor until resulted.  Nephrology consult.      3-hypokalemia.  monitor/supplement.    4-debility/weakness, PT evaluation.    5-DVT prophylaxis, UFH sq. CHIEF COMPLAINT/Diagnosis: Diahrea/ weakness/ leukocytosis    SUBJECTIVE: weakness    REVIEW OF SYSTEMS:    CONSTITUTIONAL: No weakness, fevers or chills  EYES/ENT: No visual changes;  No vertigo or throat pain   NECK: No pain or stiffness  RESPIRATORY: No cough, wheezing, hemoptysis; No shortness of breath  CARDIOVASCULAR: No chest pain or palpitations  GASTROINTESTINAL: No abdominal or epigastric pain. No nausea, vomiting, or hematemesis; No diarrhea or constipation. No melena or hematochezia.  GENITOURINARY: No dysuria, frequency or hematuria  NEUROLOGICAL: No numbness or weakness  SKIN: No itching, burning, rashes, or lesions   All other review of systems is negative unless indicated above    Vital Signs Last 24 Hrs  T(C): 36.9 (11 Jan 2020 05:20), Max: 37.4 (10 Naresh 2020 22:32)  T(F): 98.5 (11 Jan 2020 05:20), Max: 99.3 (10 Naresh 2020 22:32)  HR: 70 (11 Jan 2020 05:20) (69 - 83)  BP: 176/59 (11 Jan 2020 05:20) (107/41 - 176/59)  BP(mean): --  RR: 18 (11 Jan 2020 05:20) (16 - 18)  SpO2: 97% (11 Jan 2020 05:20) (94% - 98%)    I&O's Summary      CAPILLARY BLOOD GLUCOSE          PHYSICAL EXAM:    Constitutional: NAD, awake and alert, well-developed  HEENT: PERR, EOMI, Normal Hearing, MMM  Neck: Soft and supple, No LAD, No JVD  Respiratory: Breath sounds are clear bilaterally, No wheezing, rales or rhonchi  Cardiovascular: S1 and S2, regular rate and rhythm, no Murmurs, gallops or rubs  Gastrointestinal: Bowel Sounds present, soft, nontender, nondistended, no guarding, no rebound  Extremities: No peripheral edema  Vascular: 2+ peripheral pulses  Neurological: A/O x 3, no focal deficits  Musculoskeletal: 5/5 strength b/l upper and lower extremities  Skin: No rashes    MEDICATIONS:  MEDICATIONS  (STANDING):  atorvastatin 10 milliGRAM(s) Oral at bedtime  cloNIDine 0.1 milliGRAM(s) Oral three times a day  escitalopram 10 milliGRAM(s) Oral daily  heparin  Injectable 5000 Unit(s) SubCutaneous every 12 hours  metoprolol succinate  milliGRAM(s) Oral daily  mirtazapine 15 milliGRAM(s) Oral at bedtime  multivitamin 1 Tablet(s) Oral daily  sodium chloride 0.9%. 1000 milliLiter(s) (80 mL/Hr) IV Continuous <Continuous>      LABS: All Labs Reviewed:                        14.1   21.46 )-----------( 217      ( 10 Naresh 2020 08:50 )             41.8     01-10    138  |  111<H>  |  74<H>  ----------------------------<  213<H>  3.3<L>   |  17<L>  |  2.90<H>    Ca    8.9      10 Naresh 2020 08:50    TPro  7.9  /  Alb  3.8  /  TBili  0.8  /  DBili  x   /  AST  30  /  ALT  35  /  AlkPhos  53  01-10          Assessment and Plan:     89F.  hx dementia.  presented to ED c/o 5 day hx of dehydration, weakness and diarrhea.    1-diarrhea,   --r/o viral v. bacterial enteritis.  stool Cx, O&P and C. dif PCR.    2-left ureteral stone 5mm  -u/s shows stone ; patient admitted with leukocytosis unclear if pyelonephritis present?  -will or CT abd pelvis r/o stone.   -will start iv ceftriaxone    2-ERICA.  prerenal azotemia/dehydration.  monitor urine output.  trend BMP.  UA bland.  US kidney and bladder.  IVFs.  DC digoxin, losartan and HCTZ.  stat digoxin level and place on telemetry monitor until resulted.  Nephrology consult.      3-hypokalemia.  monitor/supplement.    4-debility/weakness, PT evaluation.    5-DVT prophylaxis, UFH sq.

## 2020-01-12 LAB
ANION GAP SERPL CALC-SCNC: 6 MMOL/L — SIGNIFICANT CHANGE UP (ref 5–17)
BUN SERPL-MCNC: 26 MG/DL — HIGH (ref 7–23)
CALCIUM SERPL-MCNC: 7.9 MG/DL — LOW (ref 8.5–10.1)
CHLORIDE SERPL-SCNC: 118 MMOL/L — HIGH (ref 96–108)
CO2 SERPL-SCNC: 20 MMOL/L — LOW (ref 22–31)
CREAT SERPL-MCNC: 1.12 MG/DL — SIGNIFICANT CHANGE UP (ref 0.5–1.3)
GLUCOSE SERPL-MCNC: 148 MG/DL — HIGH (ref 70–99)
HCT VFR BLD CALC: 35.1 % — SIGNIFICANT CHANGE UP (ref 34.5–45)
HGB BLD-MCNC: 11.9 G/DL — SIGNIFICANT CHANGE UP (ref 11.5–15.5)
MCHC RBC-ENTMCNC: 31.5 PG — SIGNIFICANT CHANGE UP (ref 27–34)
MCHC RBC-ENTMCNC: 33.9 GM/DL — SIGNIFICANT CHANGE UP (ref 32–36)
MCV RBC AUTO: 92.9 FL — SIGNIFICANT CHANGE UP (ref 80–100)
PLATELET # BLD AUTO: 130 K/UL — LOW (ref 150–400)
POTASSIUM SERPL-MCNC: 3.3 MMOL/L — LOW (ref 3.5–5.3)
POTASSIUM SERPL-SCNC: 3.3 MMOL/L — LOW (ref 3.5–5.3)
RBC # BLD: 3.78 M/UL — LOW (ref 3.8–5.2)
RBC # FLD: 12.5 % — SIGNIFICANT CHANGE UP (ref 10.3–14.5)
SODIUM SERPL-SCNC: 144 MMOL/L — SIGNIFICANT CHANGE UP (ref 135–145)
SODIUM UR-SCNC: 81 MMOL/L — SIGNIFICANT CHANGE UP
WBC # BLD: 7.51 K/UL — SIGNIFICANT CHANGE UP (ref 3.8–10.5)
WBC # FLD AUTO: 7.51 K/UL — SIGNIFICANT CHANGE UP (ref 3.8–10.5)

## 2020-01-12 RX ORDER — POTASSIUM CHLORIDE 20 MEQ
40 PACKET (EA) ORAL ONCE
Refills: 0 | Status: COMPLETED | OUTPATIENT
Start: 2020-01-12 | End: 2020-01-13

## 2020-01-12 RX ADMIN — Medication 0.1 MILLIGRAM(S): at 05:00

## 2020-01-12 RX ADMIN — Medication 0.1 MILLIGRAM(S): at 13:43

## 2020-01-12 RX ADMIN — Medication 1 TABLET(S): at 11:38

## 2020-01-12 RX ADMIN — MIRTAZAPINE 15 MILLIGRAM(S): 45 TABLET, ORALLY DISINTEGRATING ORAL at 22:41

## 2020-01-12 RX ADMIN — ESCITALOPRAM OXALATE 10 MILLIGRAM(S): 10 TABLET, FILM COATED ORAL at 11:38

## 2020-01-12 RX ADMIN — Medication 0.1 MILLIGRAM(S): at 22:41

## 2020-01-12 RX ADMIN — HEPARIN SODIUM 5000 UNIT(S): 5000 INJECTION INTRAVENOUS; SUBCUTANEOUS at 05:01

## 2020-01-12 RX ADMIN — HEPARIN SODIUM 5000 UNIT(S): 5000 INJECTION INTRAVENOUS; SUBCUTANEOUS at 17:44

## 2020-01-12 RX ADMIN — CEFTRIAXONE 1000 MILLIGRAM(S): 500 INJECTION, POWDER, FOR SOLUTION INTRAMUSCULAR; INTRAVENOUS at 11:40

## 2020-01-12 RX ADMIN — ATORVASTATIN CALCIUM 10 MILLIGRAM(S): 80 TABLET, FILM COATED ORAL at 22:41

## 2020-01-12 RX ADMIN — Medication 100 MILLIGRAM(S): at 05:00

## 2020-01-12 RX ADMIN — SODIUM CHLORIDE 80 MILLILITER(S): 9 INJECTION INTRAMUSCULAR; INTRAVENOUS; SUBCUTANEOUS at 11:37

## 2020-01-12 NOTE — PROGRESS NOTE ADULT - SUBJECTIVE AND OBJECTIVE BOX
CHIEF COMPLAINT/Diagnosis: diahrea/ nausea/ weakness/ Ureteral stone/ likley pyelonephritis    SUBJECTIVE: feels better today, more awake and alert and having conversation; apetite still poor; 2 episodes of diahrea yest    REVIEW OF SYSTEMS:    CONSTITUTIONAL: No weakness, fevers or chills  EYES/ENT: No visual changes;  No vertigo or throat pain   NECK: No pain or stiffness  RESPIRATORY: No cough, wheezing, hemoptysis; No shortness of breath  CARDIOVASCULAR: No chest pain or palpitations  GASTROINTESTINAL: No abdominal or epigastric pain. No nausea, vomiting, or hematemesis; No diarrhea or constipation. No melena or hematochezia.  GENITOURINARY: No dysuria, frequency or hematuria  NEUROLOGICAL: No numbness or weakness  SKIN: No itching, burning, rashes, or lesions   All other review of systems is negative unless indicated above    Vital Signs Last 24 Hrs  T(C): 37.7 (12 Jan 2020 12:20), Max: 37.7 (12 Jan 2020 12:20)  T(F): 99.8 (12 Jan 2020 12:20), Max: 99.8 (12 Jan 2020 12:20)  HR: 69 (12 Jan 2020 12:20) (69 - 72)  BP: 182/54 (12 Jan 2020 12:20) (154/63 - 182/54)  BP(mean): --  RR: 18 (12 Jan 2020 12:20) (18 - 18)  SpO2: 98% (12 Jan 2020 12:20) (98% - 99%)    I&O's Summary    11 Jan 2020 07:01  -  12 Jan 2020 07:00  --------------------------------------------------------  IN: 1910 mL / OUT: 0 mL / NET: 1910 mL    12 Jan 2020 07:01  -  12 Jan 2020 14:02  --------------------------------------------------------  IN: 366 mL / OUT: 0 mL / NET: 366 mL        CAPILLARY BLOOD GLUCOSE          PHYSICAL EXAM:    Constitutional: NAD, awake and alert, well-developed  HEENT: PERR, EOMI, Normal Hearing, MMM  Neck: Soft and supple, No LAD, No JVD  Respiratory: Breath sounds are clear bilaterally, No wheezing, rales or rhonchi  Cardiovascular: S1 and S2, regular rate and rhythm, no Murmurs, gallops or rubs  Gastrointestinal: Bowel Sounds present, soft, nontender, nondistended, no guarding, no rebound  Extremities: No peripheral edema  Vascular: 2+ peripheral pulses  Neurological: A/O x 3, no focal deficits  Musculoskeletal: 5/5 strength b/l upper and lower extremities  Skin: No rashes    MEDICATIONS:  MEDICATIONS  (STANDING):  atorvastatin 10 milliGRAM(s) Oral at bedtime  cefTRIAXone Injectable. 1000 milliGRAM(s) IV Push every 24 hours  cefTRIAXone Injectable.      cloNIDine 0.1 milliGRAM(s) Oral three times a day  escitalopram 10 milliGRAM(s) Oral daily  heparin  Injectable 5000 Unit(s) SubCutaneous every 12 hours  metoprolol succinate  milliGRAM(s) Oral daily  mirtazapine 15 milliGRAM(s) Oral at bedtime  multivitamin 1 Tablet(s) Oral daily      LABS: All Labs Reviewed:                        11.9   7.51  )-----------( 130      ( 12 Jan 2020 08:39 )             35.1     01-12    144  |  118<H>  |  26<H>  ----------------------------<  148<H>  3.3<L>   |  20<L>  |  1.12    Ca    7.9<L>      12 Jan 2020 08:39            Blood Culture: 01-10 @ 08:51  Organism --  Gram Stain Blood -- Gram Stain --  Specimen Source .Urine Catheterized  Culture-Blood --            Assessment and Plan:     89F.  hx dementia.  presented to ED c/o 5 day hx of dehydration, weakness and diarrhea.    1-diarrhea, possibly secondary to enteritis vs reactive from renal stone/ pyelo?  -c.diff negative  -diahrea resolved since yest. If more happens today, will need to send gi pcr    2-left ureteral stone 5mm with koki underlying pyelonephritis  -u/s shows stone ; patient admitted with leukocytosis Likley  pyelonephritis present >> CT abd/pelvis shows perinephric stranding.  No renal stone, koki passed  -c/w IV ceftriaxone  -patient improving today, more alert.     2-ERICA.  prerenal azotemia/dehydration.  monitor urine output.  trend BMP.  UA bland.  US kidney and bladder.  IVFs.  DC digoxin, losartan and HCTZ.  stat digoxin level and place on telemetry monitor until resulted.  Nephrology consult.      3-hypokalemia.  monitor/supplement.    4-debility/weakness, PT evaluation.    5-DVT prophylaxis, UFH sq.        Signout for AM attending:  patient admitted with diahrea, u/s showsed left stone. CT done, shows suspicion for pyelo, no stone, koki passed with iv fluids. continued diahrea, c.diff negative. no gi pcr sent. If more diahrea todya, nursing will send gi pcr.   anticipate dc tuesday.  overall patient is improved with iv abx and iv fluids.

## 2020-01-12 NOTE — DIETITIAN INITIAL EVALUATION ADULT. - PERTINENT MEDS FT
atorvastatin 10 mg oral tablet: 1 tab(s) orally once a day  B Complex 50 oral tablet, extended release: 1 tab(s) orally once a day  cloNIDine 0.1 mg oral tablet: 1 tab(s) orally 3 times a day  digoxin 125 mcg (0.125 mg) oral tablet: 1 tab(s) orally once a day  escitalopram 10 mg oral tablet: 1 tab(s) orally once a day  hydroCHLOROthiazide 25 mg oral tablet: 1 tab(s) orally every other day  losartan 100 mg oral tablet: 1 tab(s) orally once a day  metoprolol succinate 100 mg oral tablet, extended release: 1 tab(s) orally once a day  mirtazapine 15 mg oral tablet: 1 tab(s) orally once a day (at bedtime)  Multiple Vitamins oral tablet: 1 tab(s) orally once a day

## 2020-01-12 NOTE — CHART NOTE - NSCHARTNOTEFT_GEN_A_CORE
Upon Nutritional Assessment by the Registered Dietitian your patient was determined to meet criteria / has evidence of the following diagnosis/diagnoses:          [ ]  Mild Protein Calorie Malnutrition        [ ]  Moderate Protein Calorie Malnutrition        [x ] Severe Protein Calorie Malnutrition        [ ] Unspecified Protein Calorie Malnutrition        [ ] Underweight / BMI <19        [ ] Morbid Obesity / BMI > 40      Findings as based on:  •  Comprehensive nutrition assessment and consultation  •  Calorie counts (nutrient intake analysis)  •  Food acceptance and intake status from observations by staff  •  Follow up  •  Patient education  •  Intervention secondary to interdisciplinary rounds  •   concerns      · Malnutrition  Pt meets criteria for severe protein-calorie malnutrition in context of chronic disease.   NFPE reveals severe muscle wasting (clavicles, thighs, interosseus, calves)  , severe fat wasting (ribs), moderate fat wasting (triceps.)    PO intake < 75% nutritional needs > one month.      Treatment:    The following diet has been recommended:  Maintain regular diet  Pt rejects Ensure  Daily weights  Record PO intake in EMR after each meal (nursing.)   Add mvi w minerals.    Encourage PO intake.   Monitor PO intake, tolerance, labs and weight.      PROVIDER Section:     By signing this assessment you are acknowledging and agree with the diagnosis/diagnoses assigned by the Registered Dietitian    Comments: Upon Nutritional Assessment by the Registered Dietitian your patient was determined to meet criteria / has evidence of the following diagnosis/diagnoses:          [ ]  Mild Protein Calorie Malnutrition        [ ]  Moderate Protein Calorie Malnutrition        [x ] Severe Protein Calorie Malnutrition        [ ] Unspecified Protein Calorie Malnutrition        [ x] Underweight / BMI <19                        BMI 18.5        [ ] Morbid Obesity / BMI > 40      Findings as based on:  •  Comprehensive nutrition assessment and consultation  •  Calorie counts (nutrient intake analysis)  •  Food acceptance and intake status from observations by staff  •  Follow up  •  Patient education  •  Intervention secondary to interdisciplinary rounds  •   concerns      · Malnutrition  Pt meets criteria for severe protein-calorie malnutrition in context of chronic disease.   NFPE reveals severe muscle wasting (clavicles, thighs, interosseus, calves)  , severe fat wasting (ribs), moderate fat wasting (triceps.)    PO intake < 75% nutritional needs > one month.      Treatment:    The following diet has been recommended:  Maintain regular diet  Pt rejects Ensure  Daily weights  Record PO intake in EMR after each meal (nursing.)   Add mvi w minerals.    Encourage PO intake.   Monitor PO intake, tolerance, labs and weight.      PROVIDER Section:     By signing this assessment you are acknowledging and agree with the diagnosis/diagnoses assigned by the Registered Dietitian    Comments:

## 2020-01-12 NOTE — PROGRESS NOTE ADULT - ASSESSMENT
89 yo female with hx of dementia, htn and preesnting w diarrhea and hx of poor po intake possible gastroenteritis    w ERICA     ERICA - prerenal - w diarrhea while on HCTZ and ACE   Cr improved   monitor off  IVF    encourage po    ua - bland sediment w < 500 mg protein   renal sono - smaller kidneys expected - no hydro    trend Cr    replete K     HTN    would avoid HCTZ due to hx of po intake per aide   if needed may resume low dose losartan and monitor Cr response      NAG Met Acidosis sec to GI losses    improving - monitor      Thank you for the courtesy of this consult. We will follow this patient with you.   Management is subject to change if new information becomes available or patient condition changes.

## 2020-01-12 NOTE — DIETITIAN INITIAL EVALUATION ADULT. - ENERGY NEEDS
Ht.     62 "        Wt.    46    kg               BMI   18.5               IBW  50     kg               Pt is at   92 %  IBW

## 2020-01-12 NOTE — DIETITIAN INITIAL EVALUATION ADULT. - OTHER INFO
HPI:  89F.  hx of dementia.  lives in her own home w/ 24/7 A.  A reports patient was in her usual state of health until 01/06, onset of watery diarrhea.  became increasingly fatigued and dehydrated.  was brought to  and referred to  ED on 01/08 for IVF, dx'd w/ gastroenteritis and discharged home.  patient's condition did not improve over the following 24 hours and taken back to ED today.  Dx of diarrhea, ERICA, leukocytosis, hypokalemia HPI:  89F.  hx of dementia.  lives in her own home w/ 24/7 A.  A reports patient was in her usual state of health until 01/06, onset of watery diarrhea.  became increasingly fatigued and dehydrated.  was brought to  and referred to  ED on 01/08 for IVF, dx'd w/ gastroenteritis and discharged home.  patient's condition did not improve over the following 24 hours and taken back to ED today.  Dx of diarrhea, ERICA, leukocytosis, hypokalemia.  Spoke with pt and aide.  Aide reports pt never has good appetite, pt concurs.  Pt eats cheerios for breakfast, and often oatmeal for dinner.  No issues with chew/swallow.  No GI issues. Pt rejects ensure, will try Gelatein.

## 2020-01-12 NOTE — DIETITIAN INITIAL EVALUATION ADULT. - MALNUTRITION
Pt meets criteria for severe protein-calorie malnutrition in context of chronic disease. NFPE reveals severe muscle wasting (clavicles, thighs, interosseus, calves), severe fat wasting (ribs), moderate fat wasting (triceps.)  PO intake < 75% nutritional needs > one month. Pt meets criteria for severe protein-calorie malnutrition in context of chronic disease

## 2020-01-12 NOTE — DIETITIAN INITIAL EVALUATION ADULT. - PERTINENT LABORATORY DATA
01-11 Na146 mmol/L<H> Glu 138 mg/dL<H> K+ 3.5 mmol/L Cr  1.39 mg/dL<H> BUN 50 mg/dL<H> Phos n/a   Alb n/a   PAB n/a

## 2020-01-12 NOTE — PROGRESS NOTE ADULT - SUBJECTIVE AND OBJECTIVE BOX
HPI:  89F.  hx of dementia.  lives in her own home w/  A.  HHA reports patient was in her usual state of health until , onset of watery diarrhea.  became increasingly fatigued and dehydrated.  Brought to  and referred to  ED on  for IVF, dx'd w/ gastroenteritis and discharged home.  patient's condition did not improve over the following 24 hours and taken back to ED and 1.89 and then upon return Cr is 2.9   In ED, given 2L NS.  and aid states she generally does not drink much fluids and takes losartan and HCTZ at home    seen earlier today **    comfortable     PAST MEDICAL & SURGICAL HISTORY:  HTN (hypertension)  Artificial pacemaker    Home Medications:  atorvastatin 10 mg oral tablet: 1 tab(s) orally once a day (10 Naresh 2020 12:57)  B Complex 50 oral tablet, extended release: 1 tab(s) orally once a day (10 Naresh 2020 15:33)  cloNIDine 0.1 mg oral tablet: 1 tab(s) orally 3 times a day (10 Naresh 2020 15:33)  digoxin 125 mcg (0.125 mg) oral tablet: 1 tab(s) orally once a day (10 Naresh 2020 12:57)  escitalopram 10 mg oral tablet: 1 tab(s) orally once a day (10 Naresh 2020 15:33)  hydroCHLOROthiazide 25 mg oral tablet: 1 tab(s) orally every other day (10 Naresh 2020 15:33)  losartan 100 mg oral tablet: 1 tab(s) orally once a day (10 Naresh 2020 12:57)  metoprolol succinate 100 mg oral tablet, extended release: 1 tab(s) orally once a day (10 Naresh 2020 15:33)  mirtazapine 15 mg oral tablet: 1 tab(s) orally once a day (at bedtime) (10 Naresh 2020 15:33)  Multiple Vitamins oral tablet: 1 tab(s) orally once a day (10 Naresh 2020 15:33)    MEDICATIONS  (STANDING):  MEDICATIONS  (STANDING):  atorvastatin 10 milliGRAM(s) Oral at bedtime  cefTRIAXone Injectable. 1000 milliGRAM(s) IV Push every 24 hours  cefTRIAXone Injectable.      cloNIDine 0.1 milliGRAM(s) Oral three times a day  escitalopram 10 milliGRAM(s) Oral daily  heparin  Injectable 5000 Unit(s) SubCutaneous every 12 hours  metoprolol succinate  milliGRAM(s) Oral daily  mirtazapine 15 milliGRAM(s) Oral at bedtime  multivitamin 1 Tablet(s) Oral daily    MEDICATIONS  (PRN):  acetaminophen   Tablet .. 650 milliGRAM(s) Oral every 6 hours PRN Mild Pain (1 - 3)  ondansetron Injectable 4 milliGRAM(s) IV Push every 6 hours PRN Nausea and/or Vomiting      Allergies    No Known Allergies    Intolerances      SOCIAL HISTORY:  Denies ETOh,Smoking,     FAMILY HISTORY:      REVIEW OF SYSTEMS:    UTO due to MS      Vital Signs Last 24 Hrs  T(C): 37.2 (2020 16:52), Max: 37.7 (2020 12:20)  T(F): 98.9 (2020 16:52), Max: 99.8 (2020 12:20)  HR: 71 (2020 16:52) (69 - 72)  BP: 156/56 (2020 16:52) (156/56 - 182/54)  BP(mean): --  RR: 18 (2020 16:52) (18 - 18)  SpO2: 99% (2020 16:52) (98% - 99%)        I&O's Summary    2020 07:01  -  2020 07:00  --------------------------------------------------------  IN: 1910 mL / OUT: 0 mL / NET: 1910 mL    2020 07:01  -  2020 18:09  --------------------------------------------------------  IN: 766 mL / OUT: 0 mL / NET: 766 mL            PHYSICAL EXAM:    Constitutional: frail confused   HEENT: , EOMI,  MMM  Neck: No LAD, No JVD  Respiratory: CTAB  Cardiovascular: S1 and S2  Gastrointestinal: BS+, soft, NT/ND  Extremities: No peripheral edema  Neurological: Alert confused  : No Servin  Skin: No rashes  Access: Not applicable    LABS:               144    |  118    |  26     ----------------------------<  148       2020 08:39  3.3     |  20     |  1.12     146    |  121    |  50     ----------------------------<  138       2020 08:51  3.5     |  18     |  1.39     138    |  111    |  74     ----------------------------<  213       10 2020 08:50  3.3     |  17     |  2.90     Ca    7.9        2020 08:39  Ca    7.8        2020 08:51                            11.9   7.51  )-----------( 130      ( 2020 08:39 )             35.1                         11.3   8.91  )-----------( 142      ( 2020 08:51 )             33.9       Urine Studies:  Urinalysis Basic - ( 10 Naresh 2020 08:51 )    Color: Yellow / Appearance: Slightly Turbid / S.020 / pH: x  Gluc: x / Ketone: Trace  / Bili: Small / Urobili: Negative mg/dL   Blood: x / Protein: 100 mg/dL / Nitrite: Negative   Leuk Esterase: Negative / RBC: 0-2 /HPF / WBC Negative   Sq Epi: x / Non Sq Epi: x / Bacteria: x          Protein/Creatinine Ratio Calculation: 0.4 Ratio (20 @ 19:36)      RADIOLOGY & ADDITIONAL STUDIES:    PROCEDURE DATE:  01/10/2020          INTERPRETATION:  CLINICAL INFORMATION: Acute kidney injury    COMPARISON: CT abdomen pelvis dated 2020    TECHNIQUE: Sonography of the kidneys and bladder.     FINDINGS:    Right kidney:  8.6 cm. No hydronephrosis.    Left kidney:  9.3 cm. No hydronephrosis. Nonobstructing 5 mm calculus in the lower pole.    Urinary bladder: Underdistended. Small amount of layering debris within the urinary bladder.    IMPRESSION:     No hydronephrosis. 5 mm nonobstructing calculus in the lower pole of the left kidney.    Underdistended urinary bladder with small amount of layering debris.                .

## 2020-01-12 NOTE — DIETITIAN INITIAL EVALUATION ADULT. - ADD RECOMMEND
Record PO intake in EMR after each meal (nursing.) Add mvi w minerals.  Encourage PO intake. Monitor PO intake, tolerance, labs and weight.

## 2020-01-13 LAB
ANION GAP SERPL CALC-SCNC: 8 MMOL/L — SIGNIFICANT CHANGE UP (ref 5–17)
BUN SERPL-MCNC: 16 MG/DL — SIGNIFICANT CHANGE UP (ref 7–23)
CALCIUM SERPL-MCNC: 8.1 MG/DL — LOW (ref 8.5–10.1)
CHLORIDE SERPL-SCNC: 113 MMOL/L — HIGH (ref 96–108)
CO2 SERPL-SCNC: 21 MMOL/L — LOW (ref 22–31)
CREAT SERPL-MCNC: 0.97 MG/DL — SIGNIFICANT CHANGE UP (ref 0.5–1.3)
CULTURE RESULTS: SIGNIFICANT CHANGE UP
CULTURE RESULTS: SIGNIFICANT CHANGE UP
GLUCOSE SERPL-MCNC: 135 MG/DL — HIGH (ref 70–99)
HCT VFR BLD CALC: 33 % — LOW (ref 34.5–45)
HGB BLD-MCNC: 11.5 G/DL — SIGNIFICANT CHANGE UP (ref 11.5–15.5)
MCHC RBC-ENTMCNC: 31.8 PG — SIGNIFICANT CHANGE UP (ref 27–34)
MCHC RBC-ENTMCNC: 34.8 GM/DL — SIGNIFICANT CHANGE UP (ref 32–36)
MCV RBC AUTO: 91.2 FL — SIGNIFICANT CHANGE UP (ref 80–100)
PLATELET # BLD AUTO: 136 K/UL — LOW (ref 150–400)
POTASSIUM SERPL-MCNC: 3.2 MMOL/L — LOW (ref 3.5–5.3)
POTASSIUM SERPL-SCNC: 3.2 MMOL/L — LOW (ref 3.5–5.3)
RBC # BLD: 3.62 M/UL — LOW (ref 3.8–5.2)
RBC # FLD: 12.2 % — SIGNIFICANT CHANGE UP (ref 10.3–14.5)
SODIUM SERPL-SCNC: 142 MMOL/L — SIGNIFICANT CHANGE UP (ref 135–145)
SPECIMEN SOURCE: SIGNIFICANT CHANGE UP
SPECIMEN SOURCE: SIGNIFICANT CHANGE UP
WBC # BLD: 6.88 K/UL — SIGNIFICANT CHANGE UP (ref 3.8–10.5)
WBC # FLD AUTO: 6.88 K/UL — SIGNIFICANT CHANGE UP (ref 3.8–10.5)

## 2020-01-13 RX ORDER — SODIUM CHLORIDE 9 MG/ML
1000 INJECTION INTRAMUSCULAR; INTRAVENOUS; SUBCUTANEOUS
Refills: 0 | Status: DISCONTINUED | OUTPATIENT
Start: 2020-01-13 | End: 2020-01-14

## 2020-01-13 RX ORDER — LOSARTAN POTASSIUM 100 MG/1
100 TABLET, FILM COATED ORAL DAILY
Refills: 0 | Status: DISCONTINUED | OUTPATIENT
Start: 2020-01-13 | End: 2020-01-15

## 2020-01-13 RX ORDER — POTASSIUM CHLORIDE 20 MEQ
40 PACKET (EA) ORAL EVERY 4 HOURS
Refills: 0 | Status: COMPLETED | OUTPATIENT
Start: 2020-01-13 | End: 2020-01-13

## 2020-01-13 RX ORDER — HYDRALAZINE HCL 50 MG
10 TABLET ORAL EVERY 6 HOURS
Refills: 0 | Status: DISCONTINUED | OUTPATIENT
Start: 2020-01-13 | End: 2020-01-15

## 2020-01-13 RX ADMIN — ATORVASTATIN CALCIUM 10 MILLIGRAM(S): 80 TABLET, FILM COATED ORAL at 20:37

## 2020-01-13 RX ADMIN — Medication 0.1 MILLIGRAM(S): at 05:36

## 2020-01-13 RX ADMIN — Medication 40 MILLIEQUIVALENT(S): at 20:36

## 2020-01-13 RX ADMIN — Medication 100 MILLIGRAM(S): at 05:36

## 2020-01-13 RX ADMIN — Medication 40 MILLIEQUIVALENT(S): at 17:54

## 2020-01-13 RX ADMIN — CEFTRIAXONE 1000 MILLIGRAM(S): 500 INJECTION, POWDER, FOR SOLUTION INTRAMUSCULAR; INTRAVENOUS at 12:14

## 2020-01-13 RX ADMIN — MIRTAZAPINE 15 MILLIGRAM(S): 45 TABLET, ORALLY DISINTEGRATING ORAL at 20:37

## 2020-01-13 RX ADMIN — ESCITALOPRAM OXALATE 10 MILLIGRAM(S): 10 TABLET, FILM COATED ORAL at 14:15

## 2020-01-13 RX ADMIN — Medication 40 MILLIEQUIVALENT(S): at 06:53

## 2020-01-13 RX ADMIN — LOSARTAN POTASSIUM 100 MILLIGRAM(S): 100 TABLET, FILM COATED ORAL at 20:37

## 2020-01-13 RX ADMIN — SODIUM CHLORIDE 75 MILLILITER(S): 9 INJECTION INTRAMUSCULAR; INTRAVENOUS; SUBCUTANEOUS at 17:54

## 2020-01-13 RX ADMIN — HEPARIN SODIUM 5000 UNIT(S): 5000 INJECTION INTRAVENOUS; SUBCUTANEOUS at 17:54

## 2020-01-13 RX ADMIN — Medication 1 TABLET(S): at 14:15

## 2020-01-13 RX ADMIN — Medication 0.1 MILLIGRAM(S): at 14:16

## 2020-01-13 RX ADMIN — HEPARIN SODIUM 5000 UNIT(S): 5000 INJECTION INTRAVENOUS; SUBCUTANEOUS at 05:36

## 2020-01-13 RX ADMIN — Medication 0.1 MILLIGRAM(S): at 20:37

## 2020-01-13 RX ADMIN — Medication 10 MILLIGRAM(S): at 18:32

## 2020-01-13 NOTE — PROGRESS NOTE ADULT - ASSESSMENT
89F.  hx dementia.  presented to ED c/o 5 day hx of dehydration, weakness and diarrhea.    Diarrhea, possibly secondary to enteritis vs reactive from renal stone/ pyelo?  c.diff negative  GI PCR - Neg  IVF    Left ureteral stone 5mm with joeley underlying pyelonephritis  u/s shows stone ; patient admitted with leukocytosis Koki  pyelonephritis present >> CT abd/pelvis shows perinephric stranding.  No renal stone, koki passed  c/w IV ceftriaxone    ERICA - prerenal - w diarrhea while on HCTZ and ACE  Cr improved  encourage po   ua - bland sediment w < 500 mg protein  renal sono - smaller kidneys expected - no hydro   trend Cr   replete K   US kidney and bladder No hydronephrosis. 5 mm nonobstructing calculus in the lower pole of the left kidney.      IVFs.    DC digoxin, losartan and HCTZ.  DIg WNL   Nephrology consult appreciated     HTN    would avoid HCTZ due to hx of po intake per aide   if needed may resume low dose losartan and monitor Cr response      NAG Met Acidosis sec to GI losses   improving - monitor     Hypokalemia.    monitor/supplement.    Debility/weakness  PT evaluation.    DVT prophylaxis, UFH sq.    Signout for AM attending:  patient admitted with diarrhea u/s showsed left stone. CT done, shows suspicion for pyelo, no stone, koki passed with iv fluids. continued diahrea, c.diff and GI PCR negative.

## 2020-01-13 NOTE — PROGRESS NOTE ADULT - SUBJECTIVE AND OBJECTIVE BOX
HOSPITALIST PROGRESS NOTE:  SUBJECTIVE:  PCP:  Chief Complaint: Patient is a 89y old  Female who presents with a chief complaint of     HPI:  CC:  dehydration, weakness and diarrhea.    HPI:  89F.  hx of dementia.  lives in her own home w/ 24/7 A.  HHA reports patient was in her usual state of health until 01/06, onset of watery diarrhea.  became increasingly fatigued and dehydrated.  was brought to  and referred to  ED on 01/08 for IVF, dx'd w/ gastroenteritis and discharged home.  patient's condition did not improve over the following 24 hours and taken back to ED today.  denied recent ABx use or hospitalization over the past 90 days.  no known sick contacts.  in ED, given 2L NS.  UA, no pyuria.  UCx obtained by ED.  CXR, chest CT, AB/pelvis CT unremarkable.  Cr 2.9.  digoxin, losartan and HCTZ listed among patient's medications.    1/13: Above reviewed; patient very weak; as per the daughter not herself; diarrhea overnight; GI PCR pending     Allergies:  No Known Allergies    REVIEW OF SYSTEMS:  See HPI. All other review of systems is negative unless indicated above.     OBJECTIVE  Physical Exam:  Vital Signs:    Vital Signs Last 24 Hrs  T(C): 36.8 (13 Jan 2020 11:00), Max: 36.9 (13 Jan 2020 05:02)  T(F): 98.3 (13 Jan 2020 11:00), Max: 98.5 (13 Jan 2020 05:02)  HR: 70 (13 Jan 2020 13:44) (69 - 70)  BP: 193/66 (13 Jan 2020 13:44) (138/97 - 193/66)  BP(mean): --  RR: 16 (13 Jan 2020 11:00) (16 - 18)  SpO2: 98% (13 Jan 2020 11:00) (96% - 98%)  I&O's Summary    12 Jan 2020 07:01  -  13 Jan 2020 07:00  --------------------------------------------------------  IN: 766 mL / OUT: 0 mL / NET: 766 mL      Constitutional: NAD, awake and alert, well-developed  Neurological: no focal deficits  HEENT: PERRLA, EOMI, MMM  Neck: Soft and supple, No LAD, No JVD  Respiratory: Breath sounds are clear bilaterally, No wheezing, rales or rhonchi  Cardiovascular: S1 and S2, regular rate and rhythm; no Murmurs, gallops or rubs  Gastrointestinal: Bowel Sounds present, soft, nontender, nondistended, no guarding, no rebound tenderness  Back: No CVA tenderness   Extremities: No peripheral edema  Vascular: 2+ peripheral pulses  Musculoskeletal: 5/5 strength b/l upper and lower extremities  Skin: No rashes  Breast: Deferred  Rectal: Deferred    MEDICATIONS  (STANDING):  atorvastatin 10 milliGRAM(s) Oral at bedtime  cefTRIAXone Injectable. 1000 milliGRAM(s) IV Push every 24 hours  cefTRIAXone Injectable.      cloNIDine 0.1 milliGRAM(s) Oral three times a day  escitalopram 10 milliGRAM(s) Oral daily  heparin  Injectable 5000 Unit(s) SubCutaneous every 12 hours  metoprolol succinate  milliGRAM(s) Oral daily  mirtazapine 15 milliGRAM(s) Oral at bedtime  multivitamin 1 Tablet(s) Oral daily  potassium chloride    Tablet ER 40 milliEquivalent(s) Oral every 4 hours      LABS: All Labs Reviewed:                        11.5   6.88  )-----------( 136      ( 13 Jan 2020 08:02 )             33.0     01-13    142  |  113<H>  |  16  ----------------------------<  135<H>  3.2<L>   |  21<L>  |  0.97    Ca    8.1<L>      13 Jan 2020 08:02      Blood Culture:   01-12 @ 12:20  Organism --  Gram Stain Blood -- Gram Stain --  Specimen Source .Stool Feces  Culture-Blood --    01-10 @ 11:20  Organism --  Gram Stain Blood -- Gram Stain --  Specimen Source .Stool Feces  Culture-Blood --    01-10 @ 08:51  Organism --  Gram Stain Blood -- Gram Stain --  Specimen Source .Urine Catheterized  Culture-Blood --        RADIOLOGY/EKG:    < from: Xray Chest 1 View- PORTABLE-Urgent (01.10.20 @ 08:35) >    Impression:    Unremarkable portable chest x-ray.    < end of copied text >    < from: CT Abdomen and Pelvis No Cont (01.08.20 @ 17:25) >    IMPRESSION: No acute abdominal pathology within the limits of this noncontrast study. Semiformed stool in the colon..        < end of copied text >    < from: CT Chest No Cont (01.10.20 @ 15:03) >    IMPRESSION:     Fibrotic strands in both apices. Mild dependent atelectasis at both lung bases. No evidence of pulmonary infiltrate or consolidation.      < end of copied text >    < from: CT Abdomen and Pelvis No Cont (01.11.20 @ 15:22) >    IMPRESSION: Mild perirenal stranding. No evidence of hydronephrosis.    < end of copied text >    < from: US Kidney and Bladder (01.10.20 @ 16:29) >    IMPRESSION:     No hydronephrosis. 5 mm nonobstructing calculus in the lower pole of the left kidney.    Underdistended urinary bladder with small amount of layering debris.      < end of copied text >

## 2020-01-14 LAB
ANION GAP SERPL CALC-SCNC: 7 MMOL/L — SIGNIFICANT CHANGE UP (ref 5–17)
BUN SERPL-MCNC: 15 MG/DL — SIGNIFICANT CHANGE UP (ref 7–23)
CALCIUM SERPL-MCNC: 8.4 MG/DL — LOW (ref 8.5–10.1)
CHLORIDE SERPL-SCNC: 117 MMOL/L — HIGH (ref 96–108)
CO2 SERPL-SCNC: 20 MMOL/L — LOW (ref 22–31)
CREAT SERPL-MCNC: 1.12 MG/DL — SIGNIFICANT CHANGE UP (ref 0.5–1.3)
GLUCOSE SERPL-MCNC: 183 MG/DL — HIGH (ref 70–99)
HCT VFR BLD CALC: 34.6 % — SIGNIFICANT CHANGE UP (ref 34.5–45)
HGB BLD-MCNC: 11.9 G/DL — SIGNIFICANT CHANGE UP (ref 11.5–15.5)
MAGNESIUM SERPL-MCNC: 1.7 MG/DL — SIGNIFICANT CHANGE UP (ref 1.6–2.6)
MCHC RBC-ENTMCNC: 32.2 PG — SIGNIFICANT CHANGE UP (ref 27–34)
MCHC RBC-ENTMCNC: 34.4 GM/DL — SIGNIFICANT CHANGE UP (ref 32–36)
MCV RBC AUTO: 93.5 FL — SIGNIFICANT CHANGE UP (ref 80–100)
PHOSPHATE SERPL-MCNC: 1.9 MG/DL — LOW (ref 2.5–4.5)
PLATELET # BLD AUTO: 133 K/UL — LOW (ref 150–400)
POTASSIUM SERPL-MCNC: 4.1 MMOL/L — SIGNIFICANT CHANGE UP (ref 3.5–5.3)
POTASSIUM SERPL-SCNC: 4.1 MMOL/L — SIGNIFICANT CHANGE UP (ref 3.5–5.3)
RBC # BLD: 3.7 M/UL — LOW (ref 3.8–5.2)
RBC # FLD: 12.6 % — SIGNIFICANT CHANGE UP (ref 10.3–14.5)
SODIUM SERPL-SCNC: 144 MMOL/L — SIGNIFICANT CHANGE UP (ref 135–145)
WBC # BLD: 7.27 K/UL — SIGNIFICANT CHANGE UP (ref 3.8–10.5)
WBC # FLD AUTO: 7.27 K/UL — SIGNIFICANT CHANGE UP (ref 3.8–10.5)

## 2020-01-14 RX ORDER — LACTOBACILLUS ACIDOPHILUS 100MM CELL
1 CAPSULE ORAL
Refills: 0 | Status: DISCONTINUED | OUTPATIENT
Start: 2020-01-14 | End: 2020-01-15

## 2020-01-14 RX ORDER — AMLODIPINE BESYLATE 2.5 MG/1
10 TABLET ORAL DAILY
Refills: 0 | Status: DISCONTINUED | OUTPATIENT
Start: 2020-01-14 | End: 2020-01-15

## 2020-01-14 RX ORDER — SODIUM,POTASSIUM PHOSPHATES 278-250MG
1 POWDER IN PACKET (EA) ORAL THREE TIMES A DAY
Refills: 0 | Status: COMPLETED | OUTPATIENT
Start: 2020-01-14 | End: 2020-01-15

## 2020-01-14 RX ADMIN — AMLODIPINE BESYLATE 10 MILLIGRAM(S): 2.5 TABLET ORAL at 12:17

## 2020-01-14 RX ADMIN — Medication 1 PACKET(S): at 12:08

## 2020-01-14 RX ADMIN — ATORVASTATIN CALCIUM 10 MILLIGRAM(S): 80 TABLET, FILM COATED ORAL at 21:27

## 2020-01-14 RX ADMIN — HEPARIN SODIUM 5000 UNIT(S): 5000 INJECTION INTRAVENOUS; SUBCUTANEOUS at 18:42

## 2020-01-14 RX ADMIN — MIRTAZAPINE 15 MILLIGRAM(S): 45 TABLET, ORALLY DISINTEGRATING ORAL at 21:28

## 2020-01-14 RX ADMIN — Medication 10 MILLIGRAM(S): at 21:27

## 2020-01-14 RX ADMIN — Medication 1 TABLET(S): at 18:42

## 2020-01-14 RX ADMIN — ESCITALOPRAM OXALATE 10 MILLIGRAM(S): 10 TABLET, FILM COATED ORAL at 11:03

## 2020-01-14 RX ADMIN — Medication 1 TABLET(S): at 12:17

## 2020-01-14 RX ADMIN — Medication 0.1 MILLIGRAM(S): at 05:33

## 2020-01-14 RX ADMIN — Medication 1 TABLET(S): at 11:03

## 2020-01-14 RX ADMIN — Medication 0.1 MILLIGRAM(S): at 15:36

## 2020-01-14 RX ADMIN — Medication 0.1 MILLIGRAM(S): at 21:27

## 2020-01-14 RX ADMIN — HEPARIN SODIUM 5000 UNIT(S): 5000 INJECTION INTRAVENOUS; SUBCUTANEOUS at 05:13

## 2020-01-14 RX ADMIN — CEFTRIAXONE 1000 MILLIGRAM(S): 500 INJECTION, POWDER, FOR SOLUTION INTRAMUSCULAR; INTRAVENOUS at 11:04

## 2020-01-14 RX ADMIN — Medication 100 MILLIGRAM(S): at 05:13

## 2020-01-14 RX ADMIN — Medication 1 PACKET(S): at 21:28

## 2020-01-14 NOTE — SWALLOW BEDSIDE ASSESSMENT ADULT - ORAL PREPARATORY PHASE
Within functional limits/Pt has orientation to eating routines with focus. anticipatory mouth opening. lip grading on utensil and spoon stripping. oral containment with no anterior or lateral loss.

## 2020-01-14 NOTE — SWALLOW BEDSIDE ASSESSMENT ADULT - SWALLOW EVAL: RECOMMENDED FEEDING/EATING TECHNIQUES
alternate food with liquid/small sips/bites/allow for swallow between intakes/check mouth frequently for oral residue/pocketing/crush medication (when feasible)/position upright (90 degrees)

## 2020-01-14 NOTE — SWALLOW BEDSIDE ASSESSMENT ADULT - COMMENTS
89F.  hx of dementia.  lives in her own home w/ 24/7 A.  A reports patient was in her usual state of health until 01/06, onset of watery diarrhea.  became increasingly fatigued and dehydrated.  was brought to  and referred to  ED on 01/08 for IVF, dx'd w/ gastroenteritis and discharged home.  patient's condition did not improve over the following 24 hours and taken back to ED today.  denied recent ABx use or hospitalization over the past 90 days.  no known sick contacts.  in ED, given 2L NS.  UA, no pyuria.  UCx obtained by ED.  CXR, chest CT, AB/pelvis CT unremarkable.  Cr 2.9.  digoxin, losartan and HCTZ listed among patient's medications.   Service is asked to evaluate pt for safe diet consistency.  As per NSg, pt was very lethargic yesterday.

## 2020-01-14 NOTE — SWALLOW BEDSIDE ASSESSMENT ADULT - SWALLOW EVAL: FEEDING ASSISTANCE
pt was observed being fed by HHA, so it is unclear if Pt is an independent eater. pt states that she feeds herself at home./minimal assistance required

## 2020-01-14 NOTE — SWALLOW BEDSIDE ASSESSMENT ADULT - NS SPL SWALLOW CLINIC TRIAL FT
pt presents with no oral-pharyngeal contraindication for regular consistency diet in a setting of dementia, and recent onset of GI upset with diarrhea and confusion, with dehydration.   Maintain regular consistency diet and regular consistency.  Encourage liquid intake, as pt has reduced liquid intake as per family. Disc with Pt and family, and disc with Nsg.  meds  as tolerated.   pt upright for meals.    Encourage self-feeding.     Pt's oral-pharyngeal capacity is judged to be maximized.  Service will follow only for tolerance of diet.

## 2020-01-14 NOTE — SWALLOW BEDSIDE ASSESSMENT ADULT - SLP GENERAL OBSERVATIONS
pt seated in bed, being fed by HHA, and daughter in law also in attendance.  pt awake and alert, loks considerably younger than her stated 89 years.  verbally conversant and coherent for at least personally relevant information and social pleasantries.   Able to follow directions.

## 2020-01-14 NOTE — SWALLOW BEDSIDE ASSESSMENT ADULT - ORAL PHASE
Within functional limits/immediate bolus formation and oral procesing: observable buccal and lingual action for bolus containment , collection and re-collection. Mastication WF, though with tendency for  open mouthed chewing which compromises full lingual sweep for oral clearance on swallow.  Able t clear with liquid wash.

## 2020-01-14 NOTE — PROGRESS NOTE ADULT - SUBJECTIVE AND OBJECTIVE BOX
HOSPITALIST PROGRESS NOTE:  SUBJECTIVE:  PCP:  Chief Complaint: Patient is a 89y old  Female who presents with a chief complaint of     HPI:  CC:  dehydration, weakness and diarrhea.    HPI:  89F.  hx of dementia.  lives in her own home w/ 24/7 A.  HHA reports patient was in her usual state of health until 01/06, onset of watery diarrhea.  became increasingly fatigued and dehydrated.  was brought to  and referred to  ED on 01/08 for IVF, dx'd w/ gastroenteritis and discharged home.  patient's condition did not improve over the following 24 hours and taken back to ED today.  denied recent ABx use or hospitalization over the past 90 days.  no known sick contacts.  in ED, given 2L NS.  UA, no pyuria.  UCx obtained by ED.  CXR, chest CT, AB/pelvis CT unremarkable.  Cr 2.9.  digoxin, losartan and HCTZ listed among patient's medications.    1/13: Above reviewed; patient very weak; as per the daughter not herself; diarrhea overnight; GI PCR pending   1/14:  an episode of diarrhea this morning. Patient has no complaints. BP has been uncontrolled despite restarting losartan.     Allergies:  No Known Allergies    REVIEW OF SYSTEMS:  See HPI. All other review of systems is negative unless indicated above.     OBJECTIVE  Physical Exam:  Vital Signs Last 24 Hrs  T(C): 36.9 (14 Jan 2020 10:41), Max: 37.1 (13 Jan 2020 17:20)  T(F): 98.4 (14 Jan 2020 10:41), Max: 98.8 (13 Jan 2020 17:20)  HR: 72 (14 Jan 2020 10:41) (70 - 95)  BP: 170/62 (14 Jan 2020 13:00) (143/68 - 193/66)  BP(mean): --  RR: 18 (14 Jan 2020 10:41) (18 - 18)  SpO2: 97% (14 Jan 2020 10:41) (95% - 98%)    Constitutional: NAD, awake and alert, well-developed  Neurological: no focal deficits  HEENT: PERRLA, EOMI, MMM  Neck: Soft and supple, No LAD, No JVD  Respiratory: Breath sounds are clear bilaterally, No wheezing, rales or rhonchi  Cardiovascular: S1 and S2, regular rate and rhythm; no Murmurs, gallops or rubs  Gastrointestinal: Bowel Sounds present, soft, nontender, nondistended, no guarding, no rebound tenderness  Back: No CVA tenderness   Extremities: No peripheral edema  Vascular: 2+ peripheral pulses  Musculoskeletal: 5/5 strength b/l upper and lower extremities  Skin: No rashes  Breast: Deferred  Rectal: Deferred    MEDICATIONS  (STANDING):  atorvastatin 10 milliGRAM(s) Oral at bedtime  cefTRIAXone Injectable. 1000 milliGRAM(s) IV Push every 24 hours  cefTRIAXone Injectable.      cloNIDine 0.1 milliGRAM(s) Oral three times a day  escitalopram 10 milliGRAM(s) Oral daily  heparin  Injectable 5000 Unit(s) SubCutaneous every 12 hours  metoprolol succinate  milliGRAM(s) Oral daily  mirtazapine 15 milliGRAM(s) Oral at bedtime  multivitamin 1 Tablet(s) Oral daily  potassium chloride    Tablet ER 40 milliEquivalent(s) Oral every 4 hours    Lab Results:  CBC  CBC Full  -  ( 14 Jan 2020 08:51 )  WBC Count : 7.27 K/uL  RBC Count : 3.70 M/uL  Hemoglobin : 11.9 g/dL  Hematocrit : 34.6 %  Platelet Count - Automated : 133 K/uL  Mean Cell Volume : 93.5 fl  Mean Cell Hemoglobin : 32.2 pg  Mean Cell Hemoglobin Concentration : 34.4 gm/dL  Auto Neutrophil # : x  Auto Lymphocyte # : x  Auto Monocyte # : x  Auto Eosinophil # : x  Auto Basophil # : x  Auto Neutrophil % : x  Auto Lymphocyte % : x  Auto Monocyte % : x  Auto Eosinophil % : x  Auto Basophil % : x    .		Differential:	[] Automated		[] Manual  Chemistry                        11.9   7.27  )-----------( 133      ( 14 Jan 2020 08:51 )             34.6     01-14    144  |  117<H>  |  15  ----------------------------<  183<H>  4.1   |  20<L>  |  1.12    Ca    8.4<L>      14 Jan 2020 08:51  Phos  1.9     01-14  Mg     1.7     01-14        MICROBIOLOGY/CULTURES:  Culture Results:   GI PCR Results: NOT detected  *******Please Note:*******  GI panel PCR evaluates for:  Campylobacter, Plesiomonas shigelloides, Salmonella,  Vibrio, Yersinia enterocolitica, Enteroaggregative  Escherichia coli (EAEC), Enteropathogenic E.coli (EPEC),  Enterotoxigenic E. coli (ETEC) lt/st, Shiga-like  toxin-producing E. coli (STEC) stx1/stx2,  Shigella/ Enteroinvasive E. coli (EIEC), Cryptosporidium,  Cyclospora cayetanensis, Entamoeba histolytica,  Giardia lamblia, Adenovirus F 40/41, Astrovirus,  Norovirus GI/GII, Rotavirus A, Sapovirus (01-12 @ 12:20)  Culture Results:   No enteric pathogens isolated.  (Stool culture examined for Salmonella,  Shigella, Campylobacter, Aeromonas, Plesiomonas,  Vibrio, E.coli O157 and Yersinia) (01-10 @ 11:20)  Culture Results:   Testing in progress (01-10 @ 11:20)  Culture Results:   No growth (01-10 @ 08:51)          RADIOLOGY/EKG:    < from: Xray Chest 1 View- PORTABLE-Urgent (01.10.20 @ 08:35) >    Impression:    Unremarkable portable chest x-ray.    < end of copied text >    < from: CT Abdomen and Pelvis No Cont (01.08.20 @ 17:25) >    IMPRESSION: No acute abdominal pathology within the limits of this noncontrast study. Semiformed stool in the colon..        < end of copied text >    < from: CT Chest No Cont (01.10.20 @ 15:03) >    IMPRESSION:     Fibrotic strands in both apices. Mild dependent atelectasis at both lung bases. No evidence of pulmonary infiltrate or consolidation.      < end of copied text >    < from: CT Abdomen and Pelvis No Cont (01.11.20 @ 15:22) >    IMPRESSION: Mild perirenal stranding. No evidence of hydronephrosis.    < end of copied text >    < from: US Kidney and Bladder (01.10.20 @ 16:29) >    IMPRESSION:     No hydronephrosis. 5 mm nonobstructing calculus in the lower pole of the left kidney.    Underdistended urinary bladder with small amount of layering debris.      < end of copied text >

## 2020-01-14 NOTE — SWALLOW BEDSIDE ASSESSMENT ADULT - DIET PRIOR TO ADMI
regular consistency, but as per family, and HHA, pt not eating or drinking very much and now preferring sweet foodstuffs.

## 2020-01-14 NOTE — SWALLOW BEDSIDE ASSESSMENT ADULT - PHARYNGEAL PHASE
latency in onset of pharyngeal swallow is age-appropriate; observable larybngeal lift and no overt s/s aspiration across bolus consistencies consumed./Within functional limits

## 2020-01-14 NOTE — PROGRESS NOTE ADULT - ASSESSMENT
89F.  hx dementia.  presented to ED c/o 5 day hx of dehydration, weakness and diarrhea.    Diarrhea, possibly secondary to enteritis vs reactive from renal stone/ pyelo?  c.diff negative  GI PCR - Neg  S/P IVF  continue probiotics    Left ureteral stone 5mm with koki underlying pyelonephritis  u/s shows stone ; patient admitted with leukocytosis Koki  pyelonephritis present >> CT abd/pelvis shows perinephric stranding.  No renal stone, koki passed  c/w IV ceftriaxone    ERICA - prerenal - w diarrhea while on HCTZ and ARB  Cr improved  encourage po   ua - bland sediment w < 500 mg protein  trend Cr   US kidney and bladder No hydronephrosis. 5 mm nonobstructing calculus in the lower pole of the left kidney.      DC digoxin and HCTZ.  DIg WNL   Nephrology consult appreciated     HTN - uncontrolled   would avoid HCTZ due to hx of po intake per aide  resume losartan   start Norvasc if no improvement, may need hydralazine      NAG Met Acidosis sec to GI losses   improving - monitor     Hypokalemia.    monitor/supplement.    Debility/weakness  PT evaluation.    DVT prophylaxis, UFH sq.    Advanced Care Planning 35 minutes.  Discussion with patient and family regarding advance directives. At this time they would like her to be DNR/DNI. Form signed    Signout for AM attending:  patient admitted with diarrhea u/s showsed left stone. CT done, shows suspicion for pyelo, no stone, koki passed with iv fluids. continued diahrea, c.diff and GI PCR negative.

## 2020-01-15 ENCOUNTER — TRANSCRIPTION ENCOUNTER (OUTPATIENT)
Age: 85
End: 2020-01-15

## 2020-01-15 VITALS
HEART RATE: 70 BPM | OXYGEN SATURATION: 96 % | SYSTOLIC BLOOD PRESSURE: 151 MMHG | RESPIRATION RATE: 18 BRPM | DIASTOLIC BLOOD PRESSURE: 53 MMHG

## 2020-01-15 LAB
CULTURE RESULTS: SIGNIFICANT CHANGE UP
SPECIMEN SOURCE: SIGNIFICANT CHANGE UP

## 2020-01-15 RX ORDER — LACTOBACILLUS ACIDOPHILUS 100MM CELL
1 CAPSULE ORAL
Qty: 12 | Refills: 0
Start: 2020-01-15 | End: 2020-01-20

## 2020-01-15 RX ORDER — DIGOXIN 250 MCG
1 TABLET ORAL
Qty: 0 | Refills: 0 | DISCHARGE

## 2020-01-15 RX ORDER — AMLODIPINE BESYLATE 2.5 MG/1
1 TABLET ORAL
Qty: 30 | Refills: 0
Start: 2020-01-15 | End: 2020-02-13

## 2020-01-15 RX ORDER — CEFUROXIME AXETIL 250 MG
1 TABLET ORAL
Qty: 12 | Refills: 0
Start: 2020-01-15 | End: 2020-01-20

## 2020-01-15 RX ADMIN — ESCITALOPRAM OXALATE 10 MILLIGRAM(S): 10 TABLET, FILM COATED ORAL at 11:51

## 2020-01-15 RX ADMIN — Medication 1 TABLET(S): at 11:51

## 2020-01-15 RX ADMIN — Medication 0.1 MILLIGRAM(S): at 14:22

## 2020-01-15 RX ADMIN — Medication 1 PACKET(S): at 05:58

## 2020-01-15 RX ADMIN — AMLODIPINE BESYLATE 10 MILLIGRAM(S): 2.5 TABLET ORAL at 05:57

## 2020-01-15 RX ADMIN — Medication 1 TABLET(S): at 05:58

## 2020-01-15 RX ADMIN — LOSARTAN POTASSIUM 100 MILLIGRAM(S): 100 TABLET, FILM COATED ORAL at 05:57

## 2020-01-15 RX ADMIN — Medication 0.1 MILLIGRAM(S): at 05:57

## 2020-01-15 RX ADMIN — Medication 100 MILLIGRAM(S): at 05:57

## 2020-01-15 RX ADMIN — HEPARIN SODIUM 5000 UNIT(S): 5000 INJECTION INTRAVENOUS; SUBCUTANEOUS at 05:57

## 2020-01-15 RX ADMIN — CEFTRIAXONE 1000 MILLIGRAM(S): 500 INJECTION, POWDER, FOR SOLUTION INTRAMUSCULAR; INTRAVENOUS at 11:51

## 2020-01-15 NOTE — DISCHARGE NOTE NURSING/CASE MANAGEMENT/SOCIAL WORK - FLU SEASON?
Given onset shortly after eating, associated with emesis and nausea makes this highly suspicious for gastritis, most likely viral   Currently asymptomatic s/p maalox, pepcid and zofran, no emesis noted since admission, will advance diet in morning as tolerated  Yes...

## 2020-01-15 NOTE — DISCHARGE NOTE PROVIDER - NSDCCPCAREPLAN_GEN_ALL_CORE_FT
PRINCIPAL DISCHARGE DIAGNOSIS  Diagnosis: Dehydration  Assessment and Plan of Treatment: Left ureteral stone 5mm with koki underlying pyelonephritis  u/s shows stone ; patient admitted with leukocytosis Koki  pyelonephritis present >> CT abd/pelvis shows perinephric stranding.  No renal stone, koki passed  c/w ceftin X 6 more days with probiotics         SECONDARY DISCHARGE DIAGNOSES  Diagnosis: Hypertension  Assessment and Plan of Treatment: HTN - uncontrolled   would avoid HCTZ and Digoxin due to hx of decreased po intake   resume losartan   start Norvasc   FU with PCP and cardiologist to monitor BP in 1-2 days    Diagnosis: ERICA (acute kidney injury)  Assessment and Plan of Treatment: ERICA - prerenal - w diarrhea while on HCTZ and ARB  Cr improved  encourage po   US kidney and bladder No hydronephrosis. 5 mm nonobstructing calculus in the lower pole of the left kidney.      DC digoxin and HCTZ.  DIg WNL

## 2020-01-15 NOTE — PROVIDER CONTACT NOTE (OTHER) - SITUATION
Faxed DC papers to PCP
DR. RICHARD MCKEON, SPOKE WITH MARY JO FROM MD'S OFFICE. WILL INFORM PCP, PATIENT IS ADMITTED TO . PLEASE FAX DISCHARGE PROVIDER NOTE AND SUMMARY -[phone]
OFFICE IS AWARE OF CONSULT

## 2020-01-15 NOTE — PROGRESS NOTE ADULT - SUBJECTIVE AND OBJECTIVE BOX
HOSPITALIST PROGRESS NOTE:  SUBJECTIVE:  PCP:  Chief Complaint: Patient is a 89y old  Female who presents with a chief complaint of     HPI:  CC:  dehydration, weakness and diarrhea.    HPI:  89F.  hx of dementia.  lives in her own home w/ 24/7 A.  HHA reports patient was in her usual state of health until 01/06, onset of watery diarrhea.  became increasingly fatigued and dehydrated.  was brought to  and referred to  ED on 01/08 for IVF, dx'd w/ gastroenteritis and discharged home.  patient's condition did not improve over the following 24 hours and taken back to ED today.  denied recent ABx use or hospitalization over the past 90 days.  no known sick contacts.  in ED, given 2L NS.  UA, no pyuria.  UCx obtained by ED.  CXR, chest CT, AB/pelvis CT unremarkable.  Cr 2.9.  digoxin, losartan and HCTZ listed among patient's medications.    1/13: Above reviewed; patient very weak; as per the daughter not herself; diarrhea overnight; GI PCR pending   1/14:  an episode of diarrhea this morning. Patient has no complaints. BP has been uncontrolled despite restarting losartan.   1/15: No further diarrhea; cultures have been negative     Allergies:  No Known Allergies    REVIEW OF SYSTEMS:  See HPI. All other review of systems is negative unless indicated above.     OBJECTIVE  Physical Exam:  Vital Signs Last 24 Hrs  T(C): 36.9 (15 Naresh 2020 10:33), Max: 37.2 (14 Jan 2020 16:41)  T(F): 98.5 (15 Naresh 2020 10:33), Max: 98.9 (14 Jan 2020 16:41)  HR: 75 (15 Naresh 2020 10:33) (70 - 81)  BP: 155/43 (15 Naresh 2020 10:33) (143/46 - 172/57)  BP(mean): --  RR: 18 (15 Naresh 2020 10:33) (17 - 18)  SpO2: 97% (15 Naresh 2020 10:33) (97% - 98%)    Constitutional: NAD, awake and alert, frail  Neurological: no focal deficits  HEENT: PERRLA, EOMI, MMM  Neck: Soft and supple, No LAD, No JVD  Respiratory: Breath sounds are clear bilaterally, No wheezing, rales or rhonchi  Cardiovascular: S1 and S2, regular rate and rhythm; no Murmurs, gallops or rubs  Gastrointestinal: Bowel Sounds present, soft, nontender, nondistended, no guarding, no rebound tenderness  Back: No CVA tenderness   Extremities: No peripheral edema  Vascular: 2+ peripheral pulses  Musculoskeletal: 5/5 strength b/l upper and lower extremities  Skin: No rashes  Breast: Deferred  Rectal: Deferred    MEDICATIONS  (STANDING):  atorvastatin 10 milliGRAM(s) Oral at bedtime  cefTRIAXone Injectable. 1000 milliGRAM(s) IV Push every 24 hours  cefTRIAXone Injectable.      cloNIDine 0.1 milliGRAM(s) Oral three times a day  escitalopram 10 milliGRAM(s) Oral daily  heparin  Injectable 5000 Unit(s) SubCutaneous every 12 hours  metoprolol succinate  milliGRAM(s) Oral daily  mirtazapine 15 milliGRAM(s) Oral at bedtime  multivitamin 1 Tablet(s) Oral daily  potassium chloride    Tablet ER 40 milliEquivalent(s) Oral every 4 hours    Lab Results:  CBC  CBC Full  -  ( 14 Jan 2020 08:51 )  WBC Count : 7.27 K/uL  RBC Count : 3.70 M/uL  Hemoglobin : 11.9 g/dL  Hematocrit : 34.6 %  Platelet Count - Automated : 133 K/uL  Mean Cell Volume : 93.5 fl  Mean Cell Hemoglobin : 32.2 pg  Mean Cell Hemoglobin Concentration : 34.4 gm/dL  Auto Neutrophil # : x  Auto Lymphocyte # : x  Auto Monocyte # : x  Auto Eosinophil # : x  Auto Basophil # : x  Auto Neutrophil % : x  Auto Lymphocyte % : x  Auto Monocyte % : x  Auto Eosinophil % : x  Auto Basophil % : x    .		Differential:	[] Automated		[] Manual  Chemistry                        11.9   7.27  )-----------( 133      ( 14 Jan 2020 08:51 )             34.6     01-14    144  |  117<H>  |  15  ----------------------------<  183<H>  4.1   |  20<L>  |  1.12    Ca    8.4<L>      14 Jan 2020 08:51  Phos  1.9     01-14  Mg     1.7     01-14                  MICROBIOLOGY/CULTURES:  Culture Results:   GI PCR Results: NOT detected  *******Please Note:*******  GI panel PCR evaluates for:  Campylobacter, Plesiomonas shigelloides, Salmonella,  Vibrio, Yersinia enterocolitica, Enteroaggregative  Escherichia coli (EAEC), Enteropathogenic E.coli (EPEC),  Enterotoxigenic E. coli (ETEC) lt/st, Shiga-like  toxin-producing E. coli (STEC) stx1/stx2,  Shigella/ Enteroinvasive E. coli (EIEC), Cryptosporidium,  Cyclospora cayetanensis, Entamoeba histolytica,  Giardia lamblia, Adenovirus F 40/41, Astrovirus,  Norovirus GI/GII, Rotavirus A, Sapovirus (01-12 @ 12:20)  Culture Results:   No enteric pathogens isolated.  (Stool culture examined for Salmonella,  Shigella, Campylobacter, Aeromonas, Plesiomonas,  Vibrio, E.coli O157 and Yersinia) (01-10 @ 11:20)  Culture Results:   No Protozoa seen by trichrome stain  No Helminths or Protozoa seen in formalin concentrate  performed by iodine stain  (routine O+P not evaluated for Microsporidia,  Cryptosporidia, Cyclospora, or Isospora.)  One negative sample does not necessarily rule  out the presence of a parasitic infection.  Few White blood cells seen  Moderate Yeast like cells seen (01-10 @ 11:20)  Culture Results:   No growth (01-10 @ 08:51)      MICROBIOLOGY/CULTURES:  Culture Results:   GI PCR Results: NOT detected  *******Please Note:*******  GI panel PCR evaluates for:  Campylobacter, Plesiomonas shigelloides, Salmonella,  Vibrio, Yersinia enterocolitica, Enteroaggregative  Escherichia coli (EAEC), Enteropathogenic E.coli (EPEC),  Enterotoxigenic E. coli (ETEC) lt/st, Shiga-like  toxin-producing E. coli (STEC) stx1/stx2,  Shigella/ Enteroinvasive E. coli (EIEC), Cryptosporidium,  Cyclospora cayetanensis, Entamoeba histolytica,  Giardia lamblia, Adenovirus F 40/41, Astrovirus,  Norovirus GI/GII, Rotavirus A, Sapovirus (01-12 @ 12:20)  Culture Results:   No enteric pathogens isolated.  (Stool culture examined for Salmonella,  Shigella, Campylobacter, Aeromonas, Plesiomonas,  Vibrio, E.coli O157 and Yersinia) (01-10 @ 11:20)  Culture Results:   Testing in progress (01-10 @ 11:20)  Culture Results:   No growth (01-10 @ 08:51)          RADIOLOGY/EKG:    < from: Xray Chest 1 View- PORTABLE-Urgent (01.10.20 @ 08:35) >    Impression:    Unremarkable portable chest x-ray.    < end of copied text >    < from: CT Abdomen and Pelvis No Cont (01.08.20 @ 17:25) >    IMPRESSION: No acute abdominal pathology within the limits of this noncontrast study. Semiformed stool in the colon..        < end of copied text >    < from: CT Chest No Cont (01.10.20 @ 15:03) >    IMPRESSION:     Fibrotic strands in both apices. Mild dependent atelectasis at both lung bases. No evidence of pulmonary infiltrate or consolidation.      < end of copied text >    < from: CT Abdomen and Pelvis No Cont (01.11.20 @ 15:22) >    IMPRESSION: Mild perirenal stranding. No evidence of hydronephrosis.    < end of copied text >    < from: US Kidney and Bladder (01.10.20 @ 16:29) >    IMPRESSION:     No hydronephrosis. 5 mm nonobstructing calculus in the lower pole of the left kidney.    Underdistended urinary bladder with small amount of layering debris.      < end of copied text >

## 2020-01-15 NOTE — DISCHARGE NOTE NURSING/CASE MANAGEMENT/SOCIAL WORK - PATIENT PORTAL LINK FT
You can access the FollowMyHealth Patient Portal offered by [facility] by registering at the following website: [email or web address]. By joining [facility]’s FollowMyHealth portal, you will also be able to view your health information using other applications (apps) compatible with our system.

## 2020-01-15 NOTE — DISCHARGE NOTE PROVIDER - NSDCMRMEDTOKEN_GEN_ALL_CORE_FT
amLODIPine 10 mg oral tablet: 1 tab(s) orally once a day  atorvastatin 10 mg oral tablet: 1 tab(s) orally once a day  B Complex 50 oral tablet, extended release: 1 tab(s) orally once a day  cefuroxime 250 mg oral tablet: 1 tab(s) orally every 12 hours   cloNIDine 0.1 mg oral tablet: 1 tab(s) orally 3 times a day  escitalopram 10 mg oral tablet: 1 tab(s) orally once a day  lactobacillus acidophilus oral capsule: 1 cap(s) orally 2 times a day   losartan 100 mg oral tablet: 1 tab(s) orally once a day  metoprolol succinate 100 mg oral tablet, extended release: 1 tab(s) orally once a day  mirtazapine 15 mg oral tablet: 1 tab(s) orally once a day (at bedtime)  Multiple Vitamins oral tablet: 1 tab(s) orally once a day

## 2020-01-15 NOTE — PHYSICAL THERAPY INITIAL EVALUATION ADULT - PERTINENT HX OF CURRENT PROBLEM, REHAB EVAL
89F.  hx of dementia.  lives in her own home w/ 24/7 [facility].  A reports patient was in her usual state of health until 01/06, onset of watery diarrhea.  became increasingly fatigued and dehydrated.  was brought to  and referred to  ED on 01/08 for IVF, dx'd w/ gastroenteritis and discharged home.  patient's condition did not improve over the following 24 hours and taken back to ED today.

## 2020-01-15 NOTE — PROGRESS NOTE ADULT - ASSESSMENT
89F.  hx dementia.  presented to ED c/o 5 day hx of dehydration, weakness and diarrhea.    Diarrhea, possibly secondary to enteritis vs reactive from renal stone/ pyelo?  c.diff negative  GI PCR - Neg  S/P IVF  continue probiotics    Left ureteral stone 5mm with koki underlying pyelonephritis  u/s shows stone ; patient admitted with leukocytosis Koki  pyelonephritis present >> CT abd/pelvis shows perinephric stranding.  No renal stone, koki passed  c/w IV ceftriaxone    ERICA - prerenal - w diarrhea while on HCTZ and ARB  Cr improved  encourage po   ua - bland sediment w < 500 mg protein  trend Cr   US kidney and bladder No hydronephrosis. 5 mm nonobstructing calculus in the lower pole of the left kidney.      DC digoxin and HCTZ.  DIg WNL   Nephrology consult appreciated     HTN - uncontrolled   would avoid HCTZ due to hx of po intake per aide  resume losartan   start Norvasc if no improvement,   FU with PCP and cardiologist to moniotor BP in 1-2 days     NAG Met Acidosis sec to GI losses   improving - monitor     Hypokalemia.    monitor/supplement.    Debility/weakness  PT evaluation.    DVT prophylaxis, UFH sq.    Signout for AM attending:  patient admitted with diarrhea u/s showsed left stone. CT done, shows suspicion for pyelo, no stone, koki passed with iv fluids. continued diahrea, c.diff and GI PCR negative.

## 2020-01-17 DIAGNOSIS — N12 TUBULO-INTERSTITIAL NEPHRITIS, NOT SPECIFIED AS ACUTE OR CHRONIC: ICD-10-CM

## 2020-01-17 DIAGNOSIS — K52.9 NONINFECTIVE GASTROENTERITIS AND COLITIS, UNSPECIFIED: ICD-10-CM

## 2020-01-17 DIAGNOSIS — F03.90 UNSPECIFIED DEMENTIA WITHOUT BEHAVIORAL DISTURBANCE: ICD-10-CM

## 2020-01-17 DIAGNOSIS — E86.0 DEHYDRATION: ICD-10-CM

## 2020-01-17 DIAGNOSIS — N20.1 CALCULUS OF URETER: ICD-10-CM

## 2020-01-17 DIAGNOSIS — N17.9 ACUTE KIDNEY FAILURE, UNSPECIFIED: ICD-10-CM

## 2020-01-17 DIAGNOSIS — E43 UNSPECIFIED SEVERE PROTEIN-CALORIE MALNUTRITION: ICD-10-CM

## 2020-01-17 DIAGNOSIS — E87.6 HYPOKALEMIA: ICD-10-CM

## 2020-01-17 DIAGNOSIS — E87.2 ACIDOSIS: ICD-10-CM

## 2020-01-17 DIAGNOSIS — I10 ESSENTIAL (PRIMARY) HYPERTENSION: ICD-10-CM

## 2020-01-17 DIAGNOSIS — R53.81 OTHER MALAISE: ICD-10-CM

## 2020-01-17 DIAGNOSIS — Z95.0 PRESENCE OF CARDIAC PACEMAKER: ICD-10-CM

## 2021-12-22 ENCOUNTER — LABORATORY RESULT (OUTPATIENT)
Age: 86
End: 2021-12-22

## 2021-12-22 ENCOUNTER — APPOINTMENT (OUTPATIENT)
Dept: INTERNAL MEDICINE | Facility: CLINIC | Age: 86
End: 2021-12-22
Payer: MEDICARE

## 2021-12-22 VITALS
HEIGHT: 62 IN | TEMPERATURE: 98.3 F | HEART RATE: 70 BPM | DIASTOLIC BLOOD PRESSURE: 58 MMHG | SYSTOLIC BLOOD PRESSURE: 108 MMHG | WEIGHT: 130 LBS | OXYGEN SATURATION: 95 % | BODY MASS INDEX: 23.92 KG/M2

## 2021-12-22 DIAGNOSIS — Z23 ENCOUNTER FOR IMMUNIZATION: ICD-10-CM

## 2021-12-22 PROCEDURE — 90662 IIV NO PRSV INCREASED AG IM: CPT

## 2021-12-22 PROCEDURE — G0008: CPT

## 2021-12-22 PROCEDURE — 99204 OFFICE O/P NEW MOD 45 MIN: CPT | Mod: 25

## 2021-12-22 RX ORDER — ESCITALOPRAM OXALATE 10 MG/1
10 TABLET ORAL DAILY
Qty: 90 | Refills: 1 | Status: ACTIVE | COMMUNITY
Start: 1900-01-01 | End: 1900-01-01

## 2021-12-22 NOTE — HISTORY OF PRESENT ILLNESS
[Formal Caregiver] : formal caregiver [Other: _____] : [unfilled] [FreeTextEntry1] : Patient comes in to establish care.\par  [de-identified] : JÚNIOR TRINH is a 89 year F who comes in to establish care.\par Pt's previous PCP was Dr.Anthony Hamilton was with [facility] and last seen 6 mo ago. \par Pt sees  with [facility] and had PPM placed in March by . \par Patient denies any cp, sob,abdominal pain, nausea, vomiting, palpitations, fever, chills, constipation, diarrhea.\par

## 2021-12-22 NOTE — HEALTH RISK ASSESSMENT
[Former] : Former [No] : In the past 12 months have you used drugs other than those required for medical reasons? No [0] : 2) Feeling down, depressed, or hopeless: Not at all (0) [PHQ-2 Negative - No further assessment needed] : PHQ-2 Negative - No further assessment needed [I have developed a follow-up plan documented below in the note.] : I have developed a follow-up plan documented below in the note. [[id number]] : 0

## 2021-12-22 NOTE — ASSESSMENT
[FreeTextEntry1] : 1.HTN: continue on all medications. Check blood pressure daily, if greater than 150/90, call MD. Keep 2 gm low sodium diet, exercise as tolerated.\par \par 2.HLD: continue on atorvastatin. Patient advised on low cholesterol diet-decrease in white carbs and exercise 150 minutes per week.\par \par 3.Depression and Anxiety: continue on Lexapro and mirtazapine. \par \par 4.s/p PPM: f/u with cardiology and EP, continue all cardiac meds\par \par 5. HM: Influenza vaccine administered today. Went over side effects of vaccine.\par

## 2021-12-23 LAB
25(OH)D3 SERPL-MCNC: 26.4 NG/ML
ALBUMIN SERPL ELPH-MCNC: 4.3 G/DL
ALP BLD-CCNC: 76 U/L
ALT SERPL-CCNC: 19 U/L
ANION GAP SERPL CALC-SCNC: 16 MMOL/L
AST SERPL-CCNC: 24 U/L
BASOPHILS # BLD AUTO: 0.05 K/UL
BASOPHILS NFR BLD AUTO: 0.5 %
BILIRUB SERPL-MCNC: 0.2 MG/DL
BUN SERPL-MCNC: 25 MG/DL
CALCIUM SERPL-MCNC: 9.7 MG/DL
CHLORIDE SERPL-SCNC: 103 MMOL/L
CHOLEST SERPL-MCNC: 168 MG/DL
CO2 SERPL-SCNC: 20 MMOL/L
CREAT SERPL-MCNC: 1.4 MG/DL
EOSINOPHIL # BLD AUTO: 0.27 K/UL
EOSINOPHIL NFR BLD AUTO: 2.9 %
ESTIMATED AVERAGE GLUCOSE: 146 MG/DL
GLUCOSE SERPL-MCNC: 168 MG/DL
HBA1C MFR BLD HPLC: 6.7 %
HCT VFR BLD CALC: 42 %
HDLC SERPL-MCNC: 45 MG/DL
HGB BLD-MCNC: 13.9 G/DL
IMM GRANULOCYTES NFR BLD AUTO: 0.2 %
LDLC SERPL CALC-MCNC: 44 MG/DL
LYMPHOCYTES # BLD AUTO: 3.03 K/UL
LYMPHOCYTES NFR BLD AUTO: 32.4 %
MAN DIFF?: NORMAL
MCHC RBC-ENTMCNC: 31.7 PG
MCHC RBC-ENTMCNC: 33.1 GM/DL
MCV RBC AUTO: 95.9 FL
MONOCYTES # BLD AUTO: 0.59 K/UL
MONOCYTES NFR BLD AUTO: 6.3 %
NEUTROPHILS # BLD AUTO: 5.4 K/UL
NEUTROPHILS NFR BLD AUTO: 57.7 %
NONHDLC SERPL-MCNC: 123 MG/DL
PLATELET # BLD AUTO: 238 K/UL
POTASSIUM SERPL-SCNC: 4.2 MMOL/L
PROT SERPL-MCNC: 7.1 G/DL
RBC # BLD: 4.38 M/UL
RBC # FLD: 12.7 %
SODIUM SERPL-SCNC: 139 MMOL/L
TRIGL SERPL-MCNC: 395 MG/DL
TSH SERPL-ACNC: 15.9 UIU/ML
WBC # FLD AUTO: 9.36 K/UL

## 2021-12-30 DIAGNOSIS — R68.89 OTHER GENERAL SYMPTOMS AND SIGNS: ICD-10-CM

## 2022-01-03 ENCOUNTER — NON-APPOINTMENT (OUTPATIENT)
Age: 87
End: 2022-01-03

## 2022-01-03 LAB
RAPID RVP RESULT: NOT DETECTED
SARS-COV-2 RNA PNL RESP NAA+PROBE: NOT DETECTED

## 2022-02-10 NOTE — ED ADULT NURSE NOTE - NS ED NOTE ABUSE RESPONSE YN
Dr Hightower at bedside. Patient very agitated wanting to stay in PACU and not go down to outpatient. Patient refused pain medicine in pacu stating he didn't want to be doped up. Dr Hightower informed patient that he needed to take the PO pain meds. He then proceded to tell him his pain was a 6-7. Patient stated he felt rushed out of the PACU. I informed patient that he had been in PACU an appropriate amount of time and we do not keep patients in the PACU without cause to continue that care.       Yes

## 2022-02-17 ENCOUNTER — APPOINTMENT (OUTPATIENT)
Dept: INTERNAL MEDICINE | Facility: CLINIC | Age: 87
End: 2022-02-17

## 2022-02-23 ENCOUNTER — APPOINTMENT (OUTPATIENT)
Dept: INTERNAL MEDICINE | Facility: CLINIC | Age: 87
End: 2022-02-23
Payer: MEDICARE

## 2022-02-23 PROCEDURE — 99213 OFFICE O/P EST LOW 20 MIN: CPT | Mod: 95

## 2022-02-23 RX ORDER — AMLODIPINE BESYLATE 10 MG/1
10 TABLET ORAL DAILY
Refills: 0 | Status: ACTIVE | COMMUNITY

## 2022-02-23 RX ORDER — LOSARTAN POTASSIUM 100 MG/1
100 TABLET, FILM COATED ORAL DAILY
Refills: 0 | Status: ACTIVE | COMMUNITY

## 2022-02-23 RX ORDER — CLONIDINE HYDROCHLORIDE 0.1 MG/1
0.1 TABLET ORAL 3 TIMES DAILY
Refills: 0 | Status: ACTIVE | COMMUNITY

## 2022-02-23 RX ORDER — METOPROLOL SUCCINATE 100 MG/1
100 TABLET, EXTENDED RELEASE ORAL DAILY
Refills: 0 | Status: ACTIVE | COMMUNITY

## 2022-02-23 RX ORDER — ATORVASTATIN CALCIUM 10 MG/1
10 TABLET, FILM COATED ORAL DAILY
Refills: 0 | Status: ACTIVE | COMMUNITY

## 2022-02-25 NOTE — HISTORY OF PRESENT ILLNESS
[Home] : at home, [unfilled] , at the time of the visit. [Medical Office: ([facility])___] : at the medical office located in  [Formal Caregiver] : formal caregiver [Verbal consent obtained from patient] : the patient, [unfilled] [FreeTextEntry8] : Ms. JÚNIOR TRINH is a 89 year F who calls in for an acute visit.\par Patient needs forms filled out to have continuing care with her healthcare urgency and caregiver. She is long history of dementia diagnosed by her previous primary. Her caregiver notices even more short-term memory loss which has recently worsened. She needs assistance with all activities of daily living and house keeping.\par \par Patient denies any cp, sob,abdominal pain, nausea, vomiting, palpitations, fever, chills, constipation, diarrhea.\par

## 2022-02-25 NOTE — ASSESSMENT
[FreeTextEntry1] : 1.dementia: Discussed with caregiver and patient that forms for her services will be filled and faxed accordingly.

## 2022-04-06 ENCOUNTER — APPOINTMENT (OUTPATIENT)
Dept: INTERNAL MEDICINE | Facility: CLINIC | Age: 87
End: 2022-04-06
Payer: MEDICARE

## 2022-04-06 VITALS
WEIGHT: 129 LBS | DIASTOLIC BLOOD PRESSURE: 64 MMHG | HEART RATE: 87 BPM | HEIGHT: 62 IN | BODY MASS INDEX: 23.74 KG/M2 | OXYGEN SATURATION: 98 % | TEMPERATURE: 97.4 F | SYSTOLIC BLOOD PRESSURE: 126 MMHG

## 2022-04-06 DIAGNOSIS — R79.89 OTHER SPECIFIED ABNORMAL FINDINGS OF BLOOD CHEMISTRY: ICD-10-CM

## 2022-04-06 DIAGNOSIS — F41.9 ANXIETY DISORDER, UNSPECIFIED: ICD-10-CM

## 2022-04-06 DIAGNOSIS — F32.A ANXIETY DISORDER, UNSPECIFIED: ICD-10-CM

## 2022-04-06 DIAGNOSIS — I45.9 CONDUCTION DISORDER, UNSPECIFIED: ICD-10-CM

## 2022-04-06 PROCEDURE — 99214 OFFICE O/P EST MOD 30 MIN: CPT

## 2022-04-06 NOTE — HISTORY OF PRESENT ILLNESS
[Formal Caregiver] : formal caregiver [FreeTextEntry1] : FU [de-identified] : JÚNIOR TRINH is a 89 year F who comes in for a follow up visit.\par Patient is accompanied by her caregiver Lennie today.  Patient had elevated TSH at 15 back in December and did not have repeat blood work done since then.  Discussed the importance of this with patient and caregiver.  She has tried to make dietary changes in terms of her hypertriglyceridemia and diabetes mellitus type 2.  Discussed rechecking blood work today.  She does see her cardiologist Dr.Ari Thorpe.\par Patient denies any cp, sob,abdominal pain, nausea, vomiting, palpitations, fever, chills, constipation, diarrhea.\par

## 2022-04-06 NOTE — ASSESSMENT
[FreeTextEntry1] : 1.diabetes mellitus type 2: Recheck hemoglobin A1c.  Follow-up in 3 months.\par Pt advised to keep diabetic diet, decrease carbs and increase dietary protein intake.\par Exercise as tolerated 3-4 times a week.\par \par 2.elevated TSH: Recheck TFTs, if elevated will need to start on levothyroxine, previously with mildly high TSH in 2016.\par \par 3.hypertension: Continue on clonidine and losartan and metoprolol.  Follow-up with cardiology.\par Check blood pressure daily, if greater than 150/90, call MD. Keep 2 gm low sodium diet, exercise as tolerated.\par \par 4.hyperlipidemia: Continue on atorvastatin 10 mg once daily and recheck nonfasting lipids. Patient advised on low cholesterol diet-decrease in white carbs and exercise 150 minutes per week.\par

## 2022-04-21 LAB
ANION GAP SERPL CALC-SCNC: 17 MMOL/L
BUN SERPL-MCNC: 23 MG/DL
CALCIUM SERPL-MCNC: 10 MG/DL
CHLORIDE SERPL-SCNC: 101 MMOL/L
CHOLEST SERPL-MCNC: 176 MG/DL
CO2 SERPL-SCNC: 20 MMOL/L
CREAT SERPL-MCNC: 1.33 MG/DL
EGFR: 37 ML/MIN/1.73M2
ESTIMATED AVERAGE GLUCOSE: 146 MG/DL
GLUCOSE SERPL-MCNC: 224 MG/DL
HBA1C MFR BLD HPLC: 6.7 %
HDLC SERPL-MCNC: 49 MG/DL
LDLC SERPL CALC-MCNC: 76 MG/DL
NONHDLC SERPL-MCNC: 126 MG/DL
POTASSIUM SERPL-SCNC: 4.4 MMOL/L
SODIUM SERPL-SCNC: 138 MMOL/L
T4 FREE SERPL-MCNC: 1.1 NG/DL
TRIGL SERPL-MCNC: 250 MG/DL
TSH SERPL-ACNC: 3.99 UIU/ML

## 2022-04-22 ENCOUNTER — NON-APPOINTMENT (OUTPATIENT)
Age: 87
End: 2022-04-22

## 2022-06-20 ENCOUNTER — RX RENEWAL (OUTPATIENT)
Age: 87
End: 2022-06-20

## 2022-07-13 ENCOUNTER — APPOINTMENT (OUTPATIENT)
Dept: INTERNAL MEDICINE | Facility: CLINIC | Age: 87
End: 2022-07-13

## 2022-07-13 VITALS
HEART RATE: 68 BPM | OXYGEN SATURATION: 96 % | HEIGHT: 62 IN | SYSTOLIC BLOOD PRESSURE: 134 MMHG | WEIGHT: 129 LBS | BODY MASS INDEX: 23.74 KG/M2 | DIASTOLIC BLOOD PRESSURE: 66 MMHG | TEMPERATURE: 99 F

## 2022-07-13 DIAGNOSIS — R19.5 OTHER FECAL ABNORMALITIES: ICD-10-CM

## 2022-07-13 PROCEDURE — 99214 OFFICE O/P EST MOD 30 MIN: CPT

## 2022-07-13 NOTE — HISTORY OF PRESENT ILLNESS
[Formal Caregiver] : formal caregiver [Other: _____] : [unfilled] [FreeTextEntry1] : FU [de-identified] : JÚNIOR TRINH is a 89 year F who comes in for a follow up visit.\par Pt caretaker notes pt has loose bm at times, once in a while for the past few months. She did stop MVI and start probiotic and that has helped. No major changes in diet. \par \par Patient denies any cp, sob,abdominal pain, nausea, vomiting, palpitations, fever, chills, constipation.\par

## 2022-07-23 LAB
ANION GAP SERPL CALC-SCNC: 15 MMOL/L
BUN SERPL-MCNC: 15 MG/DL
CALCIUM SERPL-MCNC: 9.8 MG/DL
CHLORIDE SERPL-SCNC: 104 MMOL/L
CO2 SERPL-SCNC: 21 MMOL/L
CREAT SERPL-MCNC: 1.28 MG/DL
EGFR: 39 ML/MIN/1.73M2
GLUCOSE SERPL-MCNC: 141 MG/DL
MAGNESIUM SERPL-MCNC: 2.1 MG/DL
POTASSIUM SERPL-SCNC: 4.3 MMOL/L
SODIUM SERPL-SCNC: 140 MMOL/L
T4 FREE SERPL-MCNC: 1.1 NG/DL
TSH SERPL-ACNC: 3.31 UIU/ML

## 2022-07-25 ENCOUNTER — NON-APPOINTMENT (OUTPATIENT)
Age: 87
End: 2022-07-25

## 2022-10-01 NOTE — SWALLOW BEDSIDE ASSESSMENT ADULT - SWALLOW EVAL: RECOMMENDED DIET
fluticasone-umeclidin-vilanterol (Correction Dose   Subcutaneous TID WC Jess Realr, Dru MELVIN, MD  [phone] no oral-pharyngeal contraindication for regular consistency diet, solid and liquid.  encourage liquid intake throughout the day.

## 2022-10-08 ENCOUNTER — RX RENEWAL (OUTPATIENT)
Age: 87
End: 2022-10-08

## 2022-10-08 RX ORDER — CHOLECALCIFEROL (VITAMIN D3) 50 MCG
50 MCG TABLET ORAL
Qty: 90 | Refills: 1 | Status: ACTIVE | COMMUNITY
Start: 2022-10-08 | End: 1900-01-01

## 2022-10-12 ENCOUNTER — APPOINTMENT (OUTPATIENT)
Dept: INTERNAL MEDICINE | Facility: CLINIC | Age: 87
End: 2022-10-12

## 2022-10-12 VITALS
HEART RATE: 74 BPM | HEIGHT: 62 IN | BODY MASS INDEX: 23.74 KG/M2 | SYSTOLIC BLOOD PRESSURE: 118 MMHG | OXYGEN SATURATION: 97 % | DIASTOLIC BLOOD PRESSURE: 64 MMHG | WEIGHT: 129 LBS | TEMPERATURE: 98.2 F

## 2022-10-12 DIAGNOSIS — E11.9 TYPE 2 DIABETES MELLITUS W/OUT COMPLICATIONS: ICD-10-CM

## 2022-10-12 DIAGNOSIS — I49.5 SICK SINUS SYNDROME: ICD-10-CM

## 2022-10-12 DIAGNOSIS — E78.5 HYPERLIPIDEMIA, UNSPECIFIED: ICD-10-CM

## 2022-10-12 DIAGNOSIS — Z95.0 PRESENCE OF CARDIAC PACEMAKER: ICD-10-CM

## 2022-10-12 PROCEDURE — 99214 OFFICE O/P EST MOD 30 MIN: CPT

## 2022-10-12 RX ORDER — CHOLECALCIFEROL (VITAMIN D3) 50 MCG
50 MCG TABLET ORAL DAILY
Qty: 90 | Refills: 1 | Status: DISCONTINUED | COMMUNITY
Start: 2022-04-06 | End: 2022-10-12

## 2022-10-12 RX ORDER — MULTIVITAMIN
TABLET ORAL
Refills: 0 | Status: DISCONTINUED | COMMUNITY
End: 2022-10-12

## 2022-10-12 RX ORDER — BACILLUS COAGULANS/INULIN 1B-250 MG
CAPSULE ORAL
Refills: 0 | Status: ACTIVE | COMMUNITY

## 2022-10-12 NOTE — HISTORY OF PRESENT ILLNESS
[Formal Caregiver] : formal caregiver [Other: _____] : [unfilled] [FreeTextEntry1] : FU [de-identified] : JÚNIOR TRINH is a 89 year F who comes in for a follow up visit.\par Pt's caregiver notes she has had increased cough with some wheezing for the past few weeks which has improved since last week. She also notes increased sneezing and rhinorrhea as well. Pt had home covid test done 2 wks ago that was negative. She does have hx of allergies as well. \par Patient denies any cp, sob,abdominal pain, nausea, vomiting, palpitations, fever, chills, constipation, diarrhea.\par

## 2022-10-12 NOTE — ASSESSMENT
[FreeTextEntry1] : 1.cough: Patient at home COVID test -2 weeks ago.  Advised starting Z-Zurdo for bronchitis treatment. Went over instructions and side effects of medication.\par If no improvement will need chest x-ray.\par \par 2.tachybrady syndrome: Status post permanent pacemaker in 2011.  Patient recently seen by  last month and will obtain records.  No change in her medications remain.\par \par 3.diabetes mellitus type 2: Hemoglobin A1c is doing well at 6.7. Pt advised to keep diabetic diet, decrease carbs and increase dietary protein intake.\par Exercise as tolerated 3-4 times a week.\par \par 4.health maintenance: Patient's daughter Gely who is healthcare proxy called and left message to discuss mother's care.

## 2022-11-24 ENCOUNTER — EMERGENCY (EMERGENCY)
Facility: HOSPITAL | Age: 87
LOS: 0 days | Discharge: HOME | End: 2022-11-24
Attending: EMERGENCY MEDICINE
Payer: MEDICARE

## 2022-11-24 VITALS
DIASTOLIC BLOOD PRESSURE: 60 MMHG | OXYGEN SATURATION: 97 % | SYSTOLIC BLOOD PRESSURE: 104 MMHG | HEART RATE: 72 BPM | RESPIRATION RATE: 18 BRPM

## 2022-11-24 VITALS
SYSTOLIC BLOOD PRESSURE: 144 MMHG | DIASTOLIC BLOOD PRESSURE: 65 MMHG | TEMPERATURE: 98 F | RESPIRATION RATE: 18 BRPM | HEART RATE: 72 BPM | OXYGEN SATURATION: 96 %

## 2022-11-24 DIAGNOSIS — M79.662 PAIN IN LEFT LOWER LEG: ICD-10-CM

## 2022-11-24 DIAGNOSIS — Z95.0 PRESENCE OF CARDIAC PACEMAKER: Chronic | ICD-10-CM

## 2022-11-24 DIAGNOSIS — I10 ESSENTIAL (PRIMARY) HYPERTENSION: ICD-10-CM

## 2022-11-24 DIAGNOSIS — M25.552 PAIN IN LEFT HIP: ICD-10-CM

## 2022-11-24 PROCEDURE — G1004: CPT

## 2022-11-24 PROCEDURE — 73552 X-RAY EXAM OF FEMUR 2/>: CPT | Mod: 26,LT

## 2022-11-24 PROCEDURE — 72192 CT PELVIS W/O DYE: CPT | Mod: 26,MG

## 2022-11-24 PROCEDURE — 93971 EXTREMITY STUDY: CPT | Mod: LT

## 2022-11-24 PROCEDURE — 73562 X-RAY EXAM OF KNEE 3: CPT | Mod: LT

## 2022-11-24 PROCEDURE — 73562 X-RAY EXAM OF KNEE 3: CPT | Mod: 26,LT

## 2022-11-24 PROCEDURE — 72192 CT PELVIS W/O DYE: CPT | Mod: MG

## 2022-11-24 PROCEDURE — 93971 EXTREMITY STUDY: CPT | Mod: 26,LT

## 2022-11-24 PROCEDURE — 76376 3D RENDER W/INTRP POSTPROCES: CPT | Mod: 26

## 2022-11-24 PROCEDURE — 76376 3D RENDER W/INTRP POSTPROCES: CPT

## 2022-11-24 PROCEDURE — 73552 X-RAY EXAM OF FEMUR 2/>: CPT | Mod: LT

## 2022-11-24 PROCEDURE — 73503 X-RAY EXAM HIP UNI 4/> VIEWS: CPT | Mod: 26,LT

## 2022-11-24 PROCEDURE — 73503 X-RAY EXAM HIP UNI 4/> VIEWS: CPT | Mod: LT

## 2022-11-24 PROCEDURE — 99284 EMERGENCY DEPT VISIT MOD MDM: CPT | Mod: 25

## 2022-11-24 PROCEDURE — 99284 EMERGENCY DEPT VISIT MOD MDM: CPT | Mod: FS

## 2022-11-24 RX ORDER — IBUPROFEN 200 MG
600 TABLET ORAL ONCE
Refills: 0 | Status: COMPLETED | OUTPATIENT
Start: 2022-11-24 | End: 2022-11-24

## 2022-11-24 RX ADMIN — Medication 600 MILLIGRAM(S): at 11:44

## 2022-11-24 NOTE — ED STATDOCS - PATIENT PORTAL LINK FT
You can access the FollowMyHealth Patient Portal offered by [facility] by registering at the following website: [email or web address]. By joining [employer]’s FollowMyHealth portal, you will also be able to view your health information using other applications (apps) compatible with our system.

## 2022-11-24 NOTE — ED STATDOCS - NSFOLLOWUPINSTRUCTIONS_ED_ALL_ED_FT
Joint Pain       Joint pain can be caused by many things. It is likely to go away if you follow instructions from your doctor for taking care of yourself at home. Sometimes, you may need more treatment.      Follow these instructions at home:      Managing pain, stiffness, and swelling                 •If told, put ice on the painful area. To do this:  •If you have a removable elastic bandage, sling, or splint, take it off as told by your doctor.      •Put ice in a plastic bag.      •Place a towel between your skin and the bag.      •Leave the ice on for 20 minutes, 2–3 times a day.      •Take off the ice if your skin turns bright red. This is very important. If you cannot feel pain, heat, or cold, you have a greater risk of damage to the area.        •Move your fingers or toes below the painful joint often.      •Raise the painful joint above the level of your heart while you are sitting or lying down.    •If told, put heat on the painful area. Do this as often as told by your doctor. Use the heat source that your doctor recommends, such as a moist heat pack or a heating pad.  •Place a towel between your skin and the heat source.      •Leave the heat on for 20–30 minutes.      •Take off the heat if your skin gets bright red. This is especially important if you are unable to feel pain, heat, or cold. You may have a greater risk of getting burned.        Activity     •Rest the painful joint for as long as told by your doctor. Do not do things that cause pain or make your pain worse.      •Begin exercising or stretching the affected area, as told by your doctor. Ask your doctor what types of exercise are safe for you.      •Return to your normal activities when your doctor says that it is safe.      If you have an elastic bandage, sling, or splint:     •Wear it as told by your doctor. Take it only as told by your doctor.    •Loosen it your fingers or toes below the joint:  •Tingle.       •Become numb.      •Get cold and blue.        •Keep it clean.      •Ask your doctor if you should take it off before bathing.    •If it is not waterproof:  •Do not let it get wet.      •Cover it with a watertight covering when you take a bath or shower.        General instructions     •Take over-the-counter and prescription medicines only as told by your doctor. This may include medicines taken by mouth or applied to the skin.      • Do not smoke or use any products that contain nicotine or tobacco. If you need help quitting, ask your doctor.      •Keep all follow-up visits as told by your doctor. This is important.        Contact a doctor if:    •You have pain that gets worse and does not get better with medicine.      •Your joint pain does not get better in 3 days.      •You have more bruising or swelling.      •You have a fever.      •You lose 10 lb (4.5 kg) or more without trying.        Get help right away if:    •You cannot move the joint.      •Your fingers or toes tingle, become numb. or get cold and blue.      •You have a fever along with a joint that is red, warm, and swollen.        Summary    •Joint pain can be caused by many things. It often goes away if you follow instructions from your doctor for taking care of yourself at home.      •Rest the painful joint for as long as told. Do not do things that cause pain or make your pain worse.      •Take over-the-counter and prescription medicines only as told by your doctor.      This information is not intended to replace advice given to you by your health care provider. Make sure you discuss any questions you have with your health care provider.

## 2022-11-24 NOTE — ED STATDOCS - PROGRESS NOTE DETAILS
patient seen and evaluated with ED attending at intake initially.  Reporting hip pain with ambulating, is able to ambulate but with assistance.  Improves s/p medication for pain.  No acute findings on workup.  Suspect bursitis/tendinitis/arthritis.  Caregiver at bedside comfortable to bring her home.  Reviewed symptom management and return precautions -Davina Coleman PA-C

## 2022-11-24 NOTE — ED ADULT NURSE NOTE - OBJECTIVE STATEMENT
Pt presents to ED c/o left leg pain starting about one week ago. Pt's aide reports the pain started when going from sitting to standing. Pt worsens with walking. Pt's aide reports she was giving Tylenol all week with good effect but pt started complaining of pain more today. Pt is oriented to self an location. Pt's caregiver denies recent fall, trauma or injury. Pt's caregiver reports pt has been complaining of pain through whole of left leg.

## 2022-11-24 NOTE — ED STATDOCS - OBJECTIVE STATEMENT
89 y/o female with a PMHx of HTN presents to the ED with aide c/o lower leg pain. States pt started with a limp and pain to L leg a couple days ago, was given Tylenol which aided with pain. Notes pain is worsening as pt is now unable to get out of bed.  Notes fall 3 months ago.

## 2022-12-12 ENCOUNTER — NON-APPOINTMENT (OUTPATIENT)
Age: 87
End: 2022-12-12

## 2022-12-15 RX ORDER — MIRTAZAPINE 15 MG/1
15 TABLET, FILM COATED ORAL
Qty: 90 | Refills: 1 | Status: ACTIVE | COMMUNITY
Start: 2022-06-20 | End: 1900-01-01

## 2022-12-20 ENCOUNTER — APPOINTMENT (OUTPATIENT)
Dept: INTERNAL MEDICINE | Facility: CLINIC | Age: 87
End: 2022-12-20

## 2022-12-20 VITALS
OXYGEN SATURATION: 98 % | DIASTOLIC BLOOD PRESSURE: 72 MMHG | TEMPERATURE: 97.8 F | HEIGHT: 62 IN | HEART RATE: 97 BPM | SYSTOLIC BLOOD PRESSURE: 142 MMHG | BODY MASS INDEX: 22.82 KG/M2 | WEIGHT: 124 LBS | RESPIRATION RATE: 16 BRPM

## 2022-12-20 DIAGNOSIS — F03.90 UNSPECIFIED DEMENTIA W/OUT BEHAVIORAL DISTURBANCE: ICD-10-CM

## 2022-12-20 DIAGNOSIS — I10 ESSENTIAL (PRIMARY) HYPERTENSION: ICD-10-CM

## 2022-12-20 DIAGNOSIS — R05.9 COUGH, UNSPECIFIED: ICD-10-CM

## 2022-12-20 PROCEDURE — 99213 OFFICE O/P EST LOW 20 MIN: CPT

## 2022-12-20 RX ORDER — AZITHROMYCIN 250 MG/1
250 TABLET, FILM COATED ORAL
Qty: 1 | Refills: 1 | Status: DISCONTINUED | COMMUNITY
Start: 2022-10-12 | End: 2022-12-20

## 2022-12-22 PROBLEM — F03.90 DEMENTIA: Status: ACTIVE | Noted: 2021-12-22

## 2022-12-22 PROBLEM — I10 HYPERTENSION: Status: ACTIVE | Noted: 2021-12-22

## 2022-12-22 RX ORDER — DOXYCYCLINE HYCLATE 100 MG/1
100 CAPSULE ORAL
Refills: 0 | Status: ACTIVE | COMMUNITY

## 2022-12-22 RX ORDER — BENZONATATE 200 MG
CAPSULE ORAL
Refills: 0 | Status: ACTIVE | COMMUNITY

## 2022-12-22 NOTE — ASSESSMENT
[FreeTextEntry1] : \par 89 yo F pmhx dementia, DM, HTN, HLD, hx PPM, anxiety, depression for acute visit\par \par URI sx's, cough-  s/p UC eval with negative covid test and cxr, dx'd bronchitis on doxy course, improving.  Exam unremarkable\par -discussed RVP/flu swab in setting of > 1 week sx duration unlikely to change further management, declines \par -advised to complete doxycycline course as given by UC\par -cont Tessalon prn, advised to avoid concurrent use with Mirtazapine due to concurrent sedation risk\par -use Flonase more consistently with Allegra\par -advised f/u in 1-2 weeks for re-evaluation, sooner as needed\par -advised prompt medical eval if sx's worsen or new sx's arise\par \par HTN- slightly elevated, asx. Likely 2/2 URI\par -cont med regimen\par -awaiting cardiology appt 1/23, copy of cxr given to take to visit\par -advised prompt medical eval if sx onset (ie HA, dizziness, CP, sob, etc)\par \par MISC:  Continued social distancing and measure for covid19 prevention encouraged.  \par hx moderna series, hx booster 12/21.  Booster advised once current sx's resolved.\par \par \par HCM\par -yearly flu shot advised once current sx's resolved\par \par Pt has prior scheduled office f/u appt 2/14/23 with PMD, Dr. Pinedo\par -will relay visit to PMD\par

## 2022-12-22 NOTE — DATA REVIEWED
[FreeTextEntry1] : \par \par 12/13/22 (Angelique)- to be scanned into chart\par cxr: tiny left effusion.  Arterial sclerosis.  Moderate thoracic degenerative changes.  Pacemaker.

## 2022-12-22 NOTE — REVIEW OF SYSTEMS
[Negative] : Psychiatric [FreeTextEntry4] : see HPI [FreeTextEntry6] : see HPI [FreeTextEntry7] : see HPI [de-identified] : see HPI

## 2022-12-22 NOTE — PHYSICAL EXAM
[No Acute Distress] : no acute distress [Well-Appearing] : well-appearing [Normal Sclera/Conjunctiva] : normal sclera/conjunctiva [PERRL] : pupils equal round and reactive to light [EOMI] : extraocular movements intact [Normal Outer Ear/Nose] : the outer ears and nose were normal in appearance [Normal Oropharynx] : the oropharynx was normal [Normal TMs] : both tympanic membranes were normal [Normal Nasal Mucosa] : the nasal mucosa was normal [No Lymphadenopathy] : no lymphadenopathy [Supple] : supple [Thyroid Normal, No Nodules] : the thyroid was normal and there were no nodules present [No Respiratory Distress] : no respiratory distress  [Clear to Auscultation] : lungs were clear to auscultation bilaterally [Normal Rate] : normal rate  [Regular Rhythm] : with a regular rhythm [Normal S1, S2] : normal S1 and S2 [No Edema] : there was no peripheral edema [Soft] : abdomen soft [Non Tender] : non-tender [No HSM] : no HSM [Normal Supraclavicular Nodes] : no supraclavicular lymphadenopathy [Normal Posterior Cervical Nodes] : no posterior cervical lymphadenopathy [Normal Anterior Cervical Nodes] : no anterior cervical lymphadenopathy [No CVA Tenderness] : no CVA  tenderness [No Spinal Tenderness] : no spinal tenderness [No Joint Swelling] : no joint swelling [Grossly Normal Strength/Tone] : grossly normal strength/tone [No Rash] : no rash [No Focal Deficits] : no focal deficits [Normal Gait] : normal gait [Normal Affect] : the affect was normal [de-identified] : answers simple questions, cooperative [de-identified] : +cobblestoning, no pharyngeal exudate or erythema; no sinus tenderness [de-identified] : AAO x2 (not to date)

## 2022-12-22 NOTE — HISTORY OF PRESENT ILLNESS
[FreeTextEntry8] : \par Accompanied by caregiverDotty (24 hr, lives with pt)  - providing most of history\par \par 89 yo F pmhx dementia, DM, HTN, HLD, hx PPM, anxiety, depression for acute visit\par \par cc: cough x 2 weeks\par \par hx UC visit 12/13/22- states went for cough, sneezing, subjective fever, runny nose and sore throat x 7d\par -covid test negative, cxr ordered - done, told some small fluid lungs, dx'd bronchitis and given doxycycline x7d and Tessalon.  \par -Advised f/u with PMD.\par \par Reports sx's overall improving, ~ 60 %; on last day of doxycycline today\par less cough, on Tessalon prn with help\par runny nose less, helped by Allegra, using Flonase on/off- helps\par hx sore throat: has resolved\par \par hx low appetite, getting better.\par \par Denies HA, fever, chills, dizziness, CP, sob, vomiting, abdominal pain or diarrhea.\par \par Per caregiver pt at baseline MS (forgets date), minimally verbal but responsive.  Reports mood stable, sleeping well.\par \par Denies known sick contacts.  [facility] to [facility], not always compliant with wearing of mask.  No recent reported outbreaks of illness at center.\par \par hx moderna series, hx booster 12/21\par hx flu shot 12/21\par \par Followed by cardio, has f/u 1/23.\par

## 2023-02-14 ENCOUNTER — APPOINTMENT (OUTPATIENT)
Dept: INTERNAL MEDICINE | Facility: CLINIC | Age: 88
End: 2023-02-14

## 2023-04-19 ENCOUNTER — NON-APPOINTMENT (OUTPATIENT)
Age: 88
End: 2023-04-19

## 2023-04-20 ENCOUNTER — INPATIENT (INPATIENT)
Facility: HOSPITAL | Age: 88
LOS: 3 days | Discharge: HOSPICE | DRG: 871 | End: 2023-04-24
Attending: HOSPITALIST | Admitting: INTERNAL MEDICINE
Payer: MEDICARE

## 2023-04-20 VITALS
OXYGEN SATURATION: 96 % | WEIGHT: 128.97 LBS | HEART RATE: 117 BPM | RESPIRATION RATE: 18 BRPM | HEIGHT: 62 IN | DIASTOLIC BLOOD PRESSURE: 69 MMHG | TEMPERATURE: 98 F | SYSTOLIC BLOOD PRESSURE: 173 MMHG

## 2023-04-20 DIAGNOSIS — K52.9 NONINFECTIVE GASTROENTERITIS AND COLITIS, UNSPECIFIED: ICD-10-CM

## 2023-04-20 DIAGNOSIS — Z95.0 PRESENCE OF CARDIAC PACEMAKER: Chronic | ICD-10-CM

## 2023-04-20 LAB
ALBUMIN SERPL ELPH-MCNC: 3.3 G/DL — SIGNIFICANT CHANGE UP (ref 3.3–5)
ALP SERPL-CCNC: 59 U/L — SIGNIFICANT CHANGE UP (ref 40–120)
ALT FLD-CCNC: 20 U/L — SIGNIFICANT CHANGE UP (ref 12–78)
ANION GAP SERPL CALC-SCNC: 14 MMOL/L — SIGNIFICANT CHANGE UP (ref 5–17)
APPEARANCE UR: CLEAR — SIGNIFICANT CHANGE UP
AST SERPL-CCNC: 23 U/L — SIGNIFICANT CHANGE UP (ref 15–37)
BACTERIA # UR AUTO: ABNORMAL
BASE EXCESS BLDV CALC-SCNC: -11.3 MMOL/L — SIGNIFICANT CHANGE UP
BASOPHILS # BLD AUTO: 0.02 K/UL — SIGNIFICANT CHANGE UP (ref 0–0.2)
BASOPHILS NFR BLD AUTO: 0.1 % — SIGNIFICANT CHANGE UP (ref 0–2)
BILIRUB SERPL-MCNC: 0.5 MG/DL — SIGNIFICANT CHANGE UP (ref 0.2–1.2)
BILIRUB UR-MCNC: NEGATIVE — SIGNIFICANT CHANGE UP
BUN SERPL-MCNC: 20 MG/DL — SIGNIFICANT CHANGE UP (ref 7–23)
CALCIUM SERPL-MCNC: 8.5 MG/DL — SIGNIFICANT CHANGE UP (ref 8.5–10.1)
CHLORIDE SERPL-SCNC: 109 MMOL/L — HIGH (ref 96–108)
CO2 SERPL-SCNC: 12 MMOL/L — LOW (ref 22–31)
COLOR SPEC: YELLOW — SIGNIFICANT CHANGE UP
CREAT SERPL-MCNC: 1.51 MG/DL — HIGH (ref 0.5–1.3)
DIFF PNL FLD: ABNORMAL
EGFR: 32 ML/MIN/1.73M2 — LOW
EOSINOPHIL # BLD AUTO: 0.01 K/UL — SIGNIFICANT CHANGE UP (ref 0–0.5)
EOSINOPHIL NFR BLD AUTO: 0.1 % — SIGNIFICANT CHANGE UP (ref 0–6)
EPI CELLS # UR: SIGNIFICANT CHANGE UP
GLUCOSE SERPL-MCNC: 221 MG/DL — HIGH (ref 70–99)
GLUCOSE UR QL: 250
HCO3 BLDV-SCNC: 13 MMOL/L — LOW (ref 22–29)
HCT VFR BLD CALC: 40.7 % — SIGNIFICANT CHANGE UP (ref 34.5–45)
HGB BLD-MCNC: 13.5 G/DL — SIGNIFICANT CHANGE UP (ref 11.5–15.5)
IMM GRANULOCYTES NFR BLD AUTO: 0.6 % — SIGNIFICANT CHANGE UP (ref 0–0.9)
KETONES UR-MCNC: ABNORMAL
LACTATE SERPL-SCNC: 7 MMOL/L — CRITICAL HIGH (ref 0.7–2)
LACTATE SERPL-SCNC: 7 MMOL/L — CRITICAL HIGH (ref 0.7–2)
LACTATE SERPL-SCNC: 8.1 MMOL/L — CRITICAL HIGH (ref 0.7–2)
LEUKOCYTE ESTERASE UR-ACNC: NEGATIVE — SIGNIFICANT CHANGE UP
LIDOCAIN IGE QN: 103 U/L — SIGNIFICANT CHANGE UP (ref 73–393)
LYMPHOCYTES # BLD AUTO: 1.33 K/UL — SIGNIFICANT CHANGE UP (ref 1–3.3)
LYMPHOCYTES # BLD AUTO: 7.7 % — LOW (ref 13–44)
MCHC RBC-ENTMCNC: 30.5 PG — SIGNIFICANT CHANGE UP (ref 27–34)
MCHC RBC-ENTMCNC: 33.2 GM/DL — SIGNIFICANT CHANGE UP (ref 32–36)
MCV RBC AUTO: 91.9 FL — SIGNIFICANT CHANGE UP (ref 80–100)
MONOCYTES # BLD AUTO: 0.46 K/UL — SIGNIFICANT CHANGE UP (ref 0–0.9)
MONOCYTES NFR BLD AUTO: 2.7 % — SIGNIFICANT CHANGE UP (ref 2–14)
NEUTROPHILS # BLD AUTO: 15.28 K/UL — HIGH (ref 1.8–7.4)
NEUTROPHILS NFR BLD AUTO: 88.8 % — HIGH (ref 43–77)
NITRITE UR-MCNC: NEGATIVE — SIGNIFICANT CHANGE UP
PCO2 BLDV: 24 MMHG — LOW (ref 39–42)
PH BLDV: 7.33 — SIGNIFICANT CHANGE UP (ref 7.32–7.43)
PH UR: 5 — SIGNIFICANT CHANGE UP (ref 5–8)
PLATELET # BLD AUTO: 220 K/UL — SIGNIFICANT CHANGE UP (ref 150–400)
PO2 BLDV: 66 MMHG — SIGNIFICANT CHANGE UP
POTASSIUM SERPL-MCNC: 4.2 MMOL/L — SIGNIFICANT CHANGE UP (ref 3.5–5.3)
POTASSIUM SERPL-SCNC: 4.2 MMOL/L — SIGNIFICANT CHANGE UP (ref 3.5–5.3)
PROT SERPL-MCNC: 7.3 GM/DL — SIGNIFICANT CHANGE UP (ref 6–8.3)
PROT UR-MCNC: 30 MG/DL
RBC # BLD: 4.43 M/UL — SIGNIFICANT CHANGE UP (ref 3.8–5.2)
RBC # FLD: 11.9 % — SIGNIFICANT CHANGE UP (ref 10.3–14.5)
RBC CASTS # UR COMP ASSIST: ABNORMAL /HPF (ref 0–4)
SAO2 % BLDV: 94.7 % — SIGNIFICANT CHANGE UP
SODIUM SERPL-SCNC: 135 MMOL/L — SIGNIFICANT CHANGE UP (ref 135–145)
SP GR SPEC: 1.01 — SIGNIFICANT CHANGE UP (ref 1.01–1.02)
TROPONIN I, HIGH SENSITIVITY RESULT: 53.71 NG/L — HIGH
UROBILINOGEN FLD QL: NEGATIVE — SIGNIFICANT CHANGE UP
WBC # BLD: 17.2 K/UL — HIGH (ref 3.8–10.5)
WBC # FLD AUTO: 17.2 K/UL — HIGH (ref 3.8–10.5)
WBC UR QL: SIGNIFICANT CHANGE UP /HPF (ref 0–5)

## 2023-04-20 PROCEDURE — 93010 ELECTROCARDIOGRAM REPORT: CPT | Mod: 76

## 2023-04-20 PROCEDURE — 80053 COMPREHEN METABOLIC PANEL: CPT

## 2023-04-20 PROCEDURE — 74177 CT ABD & PELVIS W/CONTRAST: CPT | Mod: 26,MA

## 2023-04-20 PROCEDURE — 82803 BLOOD GASES ANY COMBINATION: CPT

## 2023-04-20 PROCEDURE — 83605 ASSAY OF LACTIC ACID: CPT

## 2023-04-20 PROCEDURE — 99222 1ST HOSP IP/OBS MODERATE 55: CPT

## 2023-04-20 PROCEDURE — 71045 X-RAY EXAM CHEST 1 VIEW: CPT | Mod: 26

## 2023-04-20 PROCEDURE — 93005 ELECTROCARDIOGRAM TRACING: CPT

## 2023-04-20 PROCEDURE — 93306 TTE W/DOPPLER COMPLETE: CPT

## 2023-04-20 PROCEDURE — 71045 X-RAY EXAM CHEST 1 VIEW: CPT

## 2023-04-20 PROCEDURE — 84484 ASSAY OF TROPONIN QUANT: CPT

## 2023-04-20 PROCEDURE — 80048 BASIC METABOLIC PNL TOTAL CA: CPT

## 2023-04-20 PROCEDURE — 85027 COMPLETE CBC AUTOMATED: CPT

## 2023-04-20 PROCEDURE — 36415 COLL VENOUS BLD VENIPUNCTURE: CPT

## 2023-04-20 PROCEDURE — 99285 EMERGENCY DEPT VISIT HI MDM: CPT

## 2023-04-20 PROCEDURE — 83735 ASSAY OF MAGNESIUM: CPT

## 2023-04-20 RX ORDER — ACETAMINOPHEN 500 MG
1000 TABLET ORAL ONCE
Refills: 0 | Status: COMPLETED | OUTPATIENT
Start: 2023-04-20 | End: 2023-04-20

## 2023-04-20 RX ORDER — ACETAMINOPHEN 500 MG
650 TABLET ORAL EVERY 6 HOURS
Refills: 0 | Status: DISCONTINUED | OUTPATIENT
Start: 2023-04-20 | End: 2023-04-24

## 2023-04-20 RX ORDER — SODIUM CHLORIDE 9 MG/ML
1000 INJECTION INTRAMUSCULAR; INTRAVENOUS; SUBCUTANEOUS
Refills: 0 | Status: DISCONTINUED | OUTPATIENT
Start: 2023-04-20 | End: 2023-04-22

## 2023-04-20 RX ORDER — ESCITALOPRAM OXALATE 10 MG/1
1 TABLET, FILM COATED ORAL
Qty: 0 | Refills: 0 | DISCHARGE

## 2023-04-20 RX ORDER — METOPROLOL TARTRATE 50 MG
100 TABLET ORAL DAILY
Refills: 0 | Status: DISCONTINUED | OUTPATIENT
Start: 2023-04-20 | End: 2023-04-24

## 2023-04-20 RX ORDER — SODIUM CHLORIDE 9 MG/ML
1000 INJECTION INTRAMUSCULAR; INTRAVENOUS; SUBCUTANEOUS ONCE
Refills: 0 | Status: COMPLETED | OUTPATIENT
Start: 2023-04-20 | End: 2023-04-20

## 2023-04-20 RX ORDER — MIRTAZAPINE 45 MG/1
1 TABLET, ORALLY DISINTEGRATING ORAL
Qty: 0 | Refills: 0 | DISCHARGE

## 2023-04-20 RX ORDER — MIRTAZAPINE 45 MG/1
15 TABLET, ORALLY DISINTEGRATING ORAL AT BEDTIME
Refills: 0 | Status: DISCONTINUED | OUTPATIENT
Start: 2023-04-20 | End: 2023-04-24

## 2023-04-20 RX ORDER — METOPROLOL TARTRATE 50 MG
1 TABLET ORAL
Qty: 0 | Refills: 0 | DISCHARGE

## 2023-04-20 RX ORDER — PIPERACILLIN AND TAZOBACTAM 4; .5 G/20ML; G/20ML
3.38 INJECTION, POWDER, LYOPHILIZED, FOR SOLUTION INTRAVENOUS ONCE
Refills: 0 | Status: COMPLETED | OUTPATIENT
Start: 2023-04-20 | End: 2023-04-20

## 2023-04-20 RX ORDER — VANCOMYCIN HCL 1 G
1000 VIAL (EA) INTRAVENOUS ONCE
Refills: 0 | Status: COMPLETED | OUTPATIENT
Start: 2023-04-20 | End: 2023-04-20

## 2023-04-20 RX ORDER — ONDANSETRON 8 MG/1
4 TABLET, FILM COATED ORAL EVERY 8 HOURS
Refills: 0 | Status: DISCONTINUED | OUTPATIENT
Start: 2023-04-20 | End: 2023-04-24

## 2023-04-20 RX ORDER — ESCITALOPRAM OXALATE 10 MG/1
10 TABLET, FILM COATED ORAL DAILY
Refills: 0 | Status: DISCONTINUED | OUTPATIENT
Start: 2023-04-20 | End: 2023-04-24

## 2023-04-20 RX ORDER — AMLODIPINE BESYLATE 2.5 MG/1
10 TABLET ORAL DAILY
Refills: 0 | Status: DISCONTINUED | OUTPATIENT
Start: 2023-04-20 | End: 2023-04-22

## 2023-04-20 RX ORDER — ATORVASTATIN CALCIUM 80 MG/1
10 TABLET, FILM COATED ORAL AT BEDTIME
Refills: 0 | Status: DISCONTINUED | OUTPATIENT
Start: 2023-04-20 | End: 2023-04-22

## 2023-04-20 RX ORDER — LANOLIN ALCOHOL/MO/W.PET/CERES
3 CREAM (GRAM) TOPICAL AT BEDTIME
Refills: 0 | Status: DISCONTINUED | OUTPATIENT
Start: 2023-04-20 | End: 2023-04-24

## 2023-04-20 RX ADMIN — SODIUM CHLORIDE 1000 MILLILITER(S): 9 INJECTION INTRAMUSCULAR; INTRAVENOUS; SUBCUTANEOUS at 21:47

## 2023-04-20 RX ADMIN — AMLODIPINE BESYLATE 10 MILLIGRAM(S): 2.5 TABLET ORAL at 23:36

## 2023-04-20 RX ADMIN — SODIUM CHLORIDE 1000 MILLILITER(S): 9 INJECTION INTRAMUSCULAR; INTRAVENOUS; SUBCUTANEOUS at 17:16

## 2023-04-20 RX ADMIN — Medication 400 MILLIGRAM(S): at 17:16

## 2023-04-20 RX ADMIN — PIPERACILLIN AND TAZOBACTAM 200 GRAM(S): 4; .5 INJECTION, POWDER, LYOPHILIZED, FOR SOLUTION INTRAVENOUS at 19:34

## 2023-04-20 RX ADMIN — SODIUM CHLORIDE 1000 MILLILITER(S): 9 INJECTION INTRAMUSCULAR; INTRAVENOUS; SUBCUTANEOUS at 23:24

## 2023-04-20 RX ADMIN — SODIUM CHLORIDE 1000 MILLILITER(S): 9 INJECTION INTRAMUSCULAR; INTRAVENOUS; SUBCUTANEOUS at 17:45

## 2023-04-20 RX ADMIN — Medication 250 MILLIGRAM(S): at 20:35

## 2023-04-20 RX ADMIN — SODIUM CHLORIDE 1000 MILLILITER(S): 9 INJECTION INTRAMUSCULAR; INTRAVENOUS; SUBCUTANEOUS at 17:00

## 2023-04-20 NOTE — H&P ADULT - NSHPPHYSICALEXAM_GEN_ALL_CORE
T(C): 37.6 (04-20-23 @ 21:54), Max: 37.7 (04-20-23 @ 17:01)  HR: 118 (04-20-23 @ 21:54) (117 - 120)  BP: 151/68 (04-20-23 @ 21:54) (151/68 - 173/69)  RR: 17 (04-20-23 @ 21:54) (17 - 18)  SpO2: 94% (04-20-23 @ 21:54) (94% - 100%)    CONSTITUTIONAL: Well groomed, no apparent distress  EYES: PERRLA and symmetric, EOMI, No conjunctival or scleral injection, non-icteric  ENMT: Oral mucosa with moist membranes. Normal dentition; no pharyngeal injection or exudates             NECK: Supple, symmetric and without tracheal deviation   RESP: No respiratory distress, no use of accessory muscles; CTA b/l, no WRR  CV: RRR, +S1S2, no MRG; no JVD; no peripheral edema  GI: Soft, NT, ND, no rebound, no guarding; no palpable masses; no hepatosplenomegaly; no hernia palpated  LYMPH: No cervical LAD or tenderness; no axillary LAD or tenderness; no inguinal LAD or tenderness  MSK: Normal ROM without pain, normal muscle strength/tone  SKIN: No rashes or ulcers noted; no subcutaneous nodules or induration palpable  NEURO: CN II-XII intact; normal reflexes in upper and lower extremities, sensation intact in upper and lower extremities b/l to light touch   PSYCH: Appropriate insight/judgment; A+O x 1-to person, mood and affect appropriate.

## 2023-04-20 NOTE — ED ADULT NURSE NOTE - NSICDXPASTMEDICALHX_GEN_ALL_CORE_FT
PAST MEDICAL HISTORY:  Dementia     DM2 (diabetes mellitus, type 2)     HLD (hyperlipidemia)     HTN (hypertension)

## 2023-04-20 NOTE — ED ADULT NURSE NOTE - OBJECTIVE STATEMENT
sib urgent care for elevated heart rate and c/o diarrhea. aide states "pt had watery stool at 4 am with abdominal cramping. pt has a hx of dementia.

## 2023-04-20 NOTE — ED ADULT NURSE REASSESSMENT NOTE - NS ED NURSE REASSESS COMMENT FT1
Pt care assumed from previous RN, Marta. Pt is resting in stretcher at this time. Pt is AOx2 at baseline and is able to follow commands. Pt  w/ rectal temp 99.7 F. MD Tariq made aware.

## 2023-04-20 NOTE — H&P ADULT - HISTORY OF PRESENT ILLNESS
89 year old Female with PMHx of dementia presented to the ED BIBEMS with home aide from urgent care for tachycardia. Home aide brought patient to urgent care secondary to 3x episodes of yellow diarrhea x 4am. Home aide stated patient didn't want to get out of bed, She appeared more fatigued than normal, and complained of nausea, chills, and brief episode chest pain. In ED patient with tenderness on abdominal exam. But she denied current chest pain, urinary symptoms, blood in stool, or any other complaints at this time. Patient is a poor historian at baseline secondary to dementia.    Family Hx:  Patient was unable to give any detail FH due to dementia.

## 2023-04-20 NOTE — ED PROVIDER NOTE - CLINICAL SUMMARY MEDICAL DECISION MAKING FREE TEXT BOX
Elderly female with diarrhea, rigors, low PO intake, and chest pain. Tachycardia, otherwise vitals normal. Pending rectal temperature. Exam with diffuse abdominal TTP. Plan: CT AP, chest x-ray, labs, UA, EKG, and reassess.

## 2023-04-20 NOTE — ED ADULT TRIAGE NOTE - CHIEF COMPLAINT QUOTE
pt presents to ED with complaints of diarrhea and elevated HR. pt was brought to urgent care by home aide and was sent to ED for further eval. per aide, pt has had loose stool since approximately 4 am. pt has hx of dementia.

## 2023-04-20 NOTE — ED PROVIDER NOTE - OBJECTIVE STATEMENT
89 year old female with PMHx of dementia, DM2, HLD, HTN, and pacemaker presents to the ED BIBEMS with home aide from urgent care for tachycardia. Home aide brought pt to urgent care secondary to 3x episodes of yellow diarrhea x 4am. Home aide states pt didn't want to get out of bed, appeared more fatigued than normal, and complained of nausea, chills, and brief episode chest pain. In ED pt with tenderness on abdominal exam, but denies current chest pain,  symptoms, blood in stool, or any other complaints at this time. Pt is a poor historian at baseline secondary to dementia.

## 2023-04-20 NOTE — H&P ADULT - ASSESSMENT
A/P:    1.  Diarrhea  Dehydration  Excessive lacticemia  Lactic Acidosis  Acute Kidney Injury  -started on IVF  -follow Lactate and BMP  -Hold losartan  -follow GI pcr    2.  Elevated Troponin  -most likely due to Demand Ischemia  -follow clinically in telemetry unit  -follow Repeat Troponin    3.  Uncontrolled BP  -adjust BP meds as needed    4.  Code status: Full code    5.  SCD for DVT ppx

## 2023-04-20 NOTE — ED PROVIDER NOTE - NSICDXPASTMEDICALHX_GEN_ALL_CORE_FT
PAST MEDICAL HISTORY:  HTN (hypertension)       Dementia     DM2 (diabetes mellitus, type 2)     HLD (hyperlipidemia)

## 2023-04-21 DIAGNOSIS — R77.8 OTHER SPECIFIED ABNORMALITIES OF PLASMA PROTEINS: ICD-10-CM

## 2023-04-21 DIAGNOSIS — A41.9 SEPSIS, UNSPECIFIED ORGANISM: ICD-10-CM

## 2023-04-21 LAB
ADD ON TEST-SPECIMEN IN LAB: SIGNIFICANT CHANGE UP
ANION GAP SERPL CALC-SCNC: 10 MMOL/L — SIGNIFICANT CHANGE UP (ref 5–17)
ANION GAP SERPL CALC-SCNC: 11 MMOL/L — SIGNIFICANT CHANGE UP (ref 5–17)
BUN SERPL-MCNC: 16 MG/DL — SIGNIFICANT CHANGE UP (ref 7–23)
BUN SERPL-MCNC: 17 MG/DL — SIGNIFICANT CHANGE UP (ref 7–23)
CALCIUM SERPL-MCNC: 8.2 MG/DL — LOW (ref 8.5–10.1)
CALCIUM SERPL-MCNC: 8.3 MG/DL — LOW (ref 8.5–10.1)
CHLORIDE SERPL-SCNC: 114 MMOL/L — HIGH (ref 96–108)
CHLORIDE SERPL-SCNC: 115 MMOL/L — HIGH (ref 96–108)
CO2 SERPL-SCNC: 15 MMOL/L — LOW (ref 22–31)
CO2 SERPL-SCNC: 16 MMOL/L — LOW (ref 22–31)
CREAT SERPL-MCNC: 1.19 MG/DL — SIGNIFICANT CHANGE UP (ref 0.5–1.3)
CREAT SERPL-MCNC: 1.37 MG/DL — HIGH (ref 0.5–1.3)
EGFR: 36 ML/MIN/1.73M2 — LOW
EGFR: 42 ML/MIN/1.73M2 — LOW
GLUCOSE SERPL-MCNC: 192 MG/DL — HIGH (ref 70–99)
GLUCOSE SERPL-MCNC: 254 MG/DL — HIGH (ref 70–99)
HCT VFR BLD CALC: 39.7 % — SIGNIFICANT CHANGE UP (ref 34.5–45)
HGB BLD-MCNC: 13 G/DL — SIGNIFICANT CHANGE UP (ref 11.5–15.5)
LACTATE SERPL-SCNC: 4.6 MMOL/L — CRITICAL HIGH (ref 0.7–2)
LACTATE SERPL-SCNC: 5.2 MMOL/L — CRITICAL HIGH (ref 0.7–2)
LACTATE SERPL-SCNC: 6.1 MMOL/L — CRITICAL HIGH (ref 0.7–2)
LACTATE SERPL-SCNC: 6.6 MMOL/L — CRITICAL HIGH (ref 0.7–2)
MAGNESIUM SERPL-MCNC: 1.6 MG/DL — SIGNIFICANT CHANGE UP (ref 1.6–2.6)
MCHC RBC-ENTMCNC: 30.4 PG — SIGNIFICANT CHANGE UP (ref 27–34)
MCHC RBC-ENTMCNC: 32.7 GM/DL — SIGNIFICANT CHANGE UP (ref 32–36)
MCV RBC AUTO: 92.8 FL — SIGNIFICANT CHANGE UP (ref 80–100)
PLATELET # BLD AUTO: 215 K/UL — SIGNIFICANT CHANGE UP (ref 150–400)
POTASSIUM SERPL-MCNC: 2.9 MMOL/L — CRITICAL LOW (ref 3.5–5.3)
POTASSIUM SERPL-MCNC: 3.4 MMOL/L — LOW (ref 3.5–5.3)
POTASSIUM SERPL-SCNC: 2.9 MMOL/L — CRITICAL LOW (ref 3.5–5.3)
POTASSIUM SERPL-SCNC: 3.4 MMOL/L — LOW (ref 3.5–5.3)
RBC # BLD: 4.28 M/UL — SIGNIFICANT CHANGE UP (ref 3.8–5.2)
RBC # FLD: 12.3 % — SIGNIFICANT CHANGE UP (ref 10.3–14.5)
SODIUM SERPL-SCNC: 140 MMOL/L — SIGNIFICANT CHANGE UP (ref 135–145)
SODIUM SERPL-SCNC: 141 MMOL/L — SIGNIFICANT CHANGE UP (ref 135–145)
TROPONIN I, HIGH SENSITIVITY RESULT: 1279.04 NG/L — HIGH
TROPONIN I, HIGH SENSITIVITY RESULT: 1283.07 NG/L — HIGH
TROPONIN I, HIGH SENSITIVITY RESULT: 312.83 NG/L — HIGH
TROPONIN I, HIGH SENSITIVITY RESULT: 738.82 NG/L — HIGH
WBC # BLD: 23.92 K/UL — HIGH (ref 3.8–10.5)
WBC # FLD AUTO: 23.92 K/UL — HIGH (ref 3.8–10.5)

## 2023-04-21 PROCEDURE — 93306 TTE W/DOPPLER COMPLETE: CPT | Mod: 26

## 2023-04-21 PROCEDURE — 99223 1ST HOSP IP/OBS HIGH 75: CPT

## 2023-04-21 PROCEDURE — 99233 SBSQ HOSP IP/OBS HIGH 50: CPT

## 2023-04-21 PROCEDURE — 93010 ELECTROCARDIOGRAM REPORT: CPT

## 2023-04-21 RX ORDER — ASPIRIN/CALCIUM CARB/MAGNESIUM 324 MG
81 TABLET ORAL DAILY
Refills: 0 | Status: DISCONTINUED | OUTPATIENT
Start: 2023-04-21 | End: 2023-04-22

## 2023-04-21 RX ORDER — PIPERACILLIN AND TAZOBACTAM 4; .5 G/20ML; G/20ML
3.38 INJECTION, POWDER, LYOPHILIZED, FOR SOLUTION INTRAVENOUS ONCE
Refills: 0 | Status: COMPLETED | OUTPATIENT
Start: 2023-04-21 | End: 2023-04-21

## 2023-04-21 RX ORDER — PIPERACILLIN AND TAZOBACTAM 4; .5 G/20ML; G/20ML
3.38 INJECTION, POWDER, LYOPHILIZED, FOR SOLUTION INTRAVENOUS EVERY 8 HOURS
Refills: 0 | Status: DISCONTINUED | OUTPATIENT
Start: 2023-04-21 | End: 2023-04-24

## 2023-04-21 RX ORDER — POTASSIUM CHLORIDE 20 MEQ
10 PACKET (EA) ORAL
Refills: 0 | Status: COMPLETED | OUTPATIENT
Start: 2023-04-21 | End: 2023-04-21

## 2023-04-21 RX ORDER — POTASSIUM CHLORIDE 20 MEQ
40 PACKET (EA) ORAL ONCE
Refills: 0 | Status: COMPLETED | OUTPATIENT
Start: 2023-04-21 | End: 2023-04-21

## 2023-04-21 RX ADMIN — Medication 3 MILLIGRAM(S): at 21:57

## 2023-04-21 RX ADMIN — ATORVASTATIN CALCIUM 10 MILLIGRAM(S): 80 TABLET, FILM COATED ORAL at 21:57

## 2023-04-21 RX ADMIN — Medication 40 MILLIEQUIVALENT(S): at 10:02

## 2023-04-21 RX ADMIN — Medication 100 MILLIEQUIVALENT(S): at 15:39

## 2023-04-21 RX ADMIN — PIPERACILLIN AND TAZOBACTAM 25 GRAM(S): 4; .5 INJECTION, POWDER, LYOPHILIZED, FOR SOLUTION INTRAVENOUS at 15:40

## 2023-04-21 RX ADMIN — Medication 0.1 MILLIGRAM(S): at 21:57

## 2023-04-21 RX ADMIN — PIPERACILLIN AND TAZOBACTAM 25 GRAM(S): 4; .5 INJECTION, POWDER, LYOPHILIZED, FOR SOLUTION INTRAVENOUS at 21:58

## 2023-04-21 RX ADMIN — Medication 0.1 MILLIGRAM(S): at 07:07

## 2023-04-21 RX ADMIN — Medication 0.1 MILLIGRAM(S): at 15:40

## 2023-04-21 RX ADMIN — ESCITALOPRAM OXALATE 10 MILLIGRAM(S): 10 TABLET, FILM COATED ORAL at 10:03

## 2023-04-21 RX ADMIN — PIPERACILLIN AND TAZOBACTAM 200 GRAM(S): 4; .5 INJECTION, POWDER, LYOPHILIZED, FOR SOLUTION INTRAVENOUS at 10:02

## 2023-04-21 RX ADMIN — Medication 100 MILLIEQUIVALENT(S): at 13:00

## 2023-04-21 RX ADMIN — MIRTAZAPINE 15 MILLIGRAM(S): 45 TABLET, ORALLY DISINTEGRATING ORAL at 21:57

## 2023-04-21 RX ADMIN — Medication 100 MILLIEQUIVALENT(S): at 11:15

## 2023-04-21 RX ADMIN — SODIUM CHLORIDE 125 MILLILITER(S): 9 INJECTION INTRAMUSCULAR; INTRAVENOUS; SUBCUTANEOUS at 01:43

## 2023-04-21 RX ADMIN — Medication 100 MILLIGRAM(S): at 10:03

## 2023-04-21 RX ADMIN — AMLODIPINE BESYLATE 10 MILLIGRAM(S): 2.5 TABLET ORAL at 10:02

## 2023-04-21 NOTE — PATIENT PROFILE ADULT - FALL HARM RISK - HARM RISK INTERVENTIONS

## 2023-04-21 NOTE — PROGRESS NOTE ADULT - CONVERSATION DETAILS
Discussed current guarded prognosis. Confirmed DNR/DNI as detailed on prior MOLST from 2020. Confirmed and updated.

## 2023-04-21 NOTE — CONSULT NOTE ADULT - SUBJECTIVE AND OBJECTIVE BOX
CHIEF COMPLAINT: No complaint -- dementia/ confusion precludes data    HPI:  89 year old woman with a history of dementia, HTN, HLD, DM, PPM, who presented to the ED for the evaluation of diarrhea and tachycardia associated with increased fatigue, nausea, chills.  Mrs Benavidez is a poor informant due to her dementia and cannot provide any history of present illness -- she is unable to answer basic questions about symptoms.  Upon admission she was found to have lactic acidosis, hyperglycemia, renal insufficiency, leukocytosis; cardiology consult called for elevated serum troponin.    PAST MEDICAL & SURGICAL HISTORY:  HTN (hypertension)  Dementia  HLD (hyperlipidemia)  DM2 (diabetes mellitus, type 2)  Artificial pacemaker    SOCIAL HISTORY: Unable to obtain from patient due to dementia    FAMILY HISTORY: Unable to obtain from patient due to dementia    MEDICATIONS  (STANDING):  amLODIPine   Tablet 10 milliGRAM(s) Oral daily  atorvastatin 10 milliGRAM(s) Oral at bedtime  cloNIDine 0.1 milliGRAM(s) Oral three times a day  escitalopram 10 milliGRAM(s) Oral daily  metoprolol succinate  milliGRAM(s) Oral daily  mirtazapine 15 milliGRAM(s) Oral at bedtime  piperacillin/tazobactam IVPB.- 3.375 Gram(s) IV Intermittent once  piperacillin/tazobactam IVPB.. 3.375 Gram(s) IV Intermittent every 8 hours  potassium chloride  10 mEq/100 mL IVPB 10 milliEquivalent(s) IV Intermittent every 1 hour  sodium chloride 0.9%. 1000 milliLiter(s) (125 mL/Hr) IV Continuous <Continuous>    MEDICATIONS  (PRN):  acetaminophen     Tablet .. 650 milliGRAM(s) Oral every 6 hours PRN Temp greater or equal to 38C (100.4F), Mild Pain (1 - 3)  aluminum hydroxide/magnesium hydroxide/simethicone Suspension 30 milliLiter(s) Oral every 4 hours PRN Dyspepsia  melatonin 3 milliGRAM(s) Oral at bedtime PRN Insomnia  ondansetron Injectable 4 milliGRAM(s) IV Push every 8 hours PRN Nausea and/or Vomiting    Allergies:  No Known Allergies    REVIEW OF SYSTEMS:  Unable to obtain from patient due to dementia    VITAL SIGNS:   Vital Signs Last 24 Hrs  T(C): 36.8 (21 Apr 2023 09:17), Max: 37.7 (20 Apr 2023 17:01)  T(F): 98.2 (21 Apr 2023 09:17), Max: 99.8 (20 Apr 2023 17:01)  HR: 113 (21 Apr 2023 09:17) (111 - 120)  BP: 165/94 (21 Apr 2023 09:17) (144/79 - 173/69)  RR: 18 (21 Apr 2023 09:17) (17 - 18)  SpO2: 95% (21 Apr 2023 09:17) (93% - 100%)    PHYSICAL EXAM:  Constitutional: Ill-appearing, awake, confused and unable to answer basic questions  HEENT:  No oral cyanosis.  Pulmonary: Tachypneic, breath sounds are clear bilaterally  Cardiovascular: S1 and S2, regular, tachycardic  Gastrointestinal: Bowel Sounds present, soft, nontender.   Lymph: No peripheral edema. No cervical lymphadenopathy.  Neurological: Moves all extremities, confused  Skin: Warm, dry  Psych:  confused, minimally agitated    LABS:                     13.0   23.92 )-----------( 215      ( 21 Apr 2023 08:27 )             39.7              141    |  115    |  16     ----------------------------<  192    2.9     |  16     |  1.19     Ca    8.3        21 Apr 2023 08:27  Mg     1.6       21 Apr 2023 08:27    TPro  7.3    /  Alb  3.3    /  TBili  0.5    /  DBili  x      /  AST  23     /  ALT  20     /  AlkPhos  59     20 Apr 2023 16:05    TroponinI hsT: <-738.82, <-312.83, <-53.71    ECG (4/21/23):  Sinus tachycardia with 1st degree AV block with PAC, nonspecific ST abnormality    CT Abdomen and Pelvis w/ IV Cont (04.20.23 @ 15:40):  No evidence for mechanical bowel obstruction. No colonic wall thickening. No free intraperitoneal air or fluid.    Xray Chest 1 View- PORTABLE-Urgent (Xray Chest 1 View- PORTABLE-urgent .) (04.20.23 @ 16:09):  1. Linear atelectasis in the left lower lobe is not present previously, while the remaining lungs are clear.  2. AV sequential pacemaker in situ with atherosclerotic aorta and no CHF, unchanged.

## 2023-04-21 NOTE — CONSULT NOTE ADULT - PROBLEM SELECTOR RECOMMENDATION 9
Elevated troponin is suggestive of myocardial injury -- likely related to sepsis; limited history; ECG with mild ST segment depression.  Continue metoprolol; add aspirin.  Await TTE.

## 2023-04-21 NOTE — PROGRESS NOTE ADULT - SUBJECTIVE AND OBJECTIVE BOX
HOSPITALIST ATTENDING PROGRESS NOTE    Chart and meds reviewed.  Patient seen and examined.    CC: diarrhea    Subjective: Pt confused, denies pain but does grimace with palpation of abdomen. Son and caregiver updated.    All other systems reviewed and found to be negative with the exception of what has been described above.    MEDICATIONS  (STANDING):  amLODIPine   Tablet 10 milliGRAM(s) Oral daily  aspirin  chewable 81 milliGRAM(s) Oral daily  atorvastatin 10 milliGRAM(s) Oral at bedtime  cloNIDine 0.1 milliGRAM(s) Oral three times a day  escitalopram 10 milliGRAM(s) Oral daily  metoprolol succinate  milliGRAM(s) Oral daily  mirtazapine 15 milliGRAM(s) Oral at bedtime  piperacillin/tazobactam IVPB.. 3.375 Gram(s) IV Intermittent every 8 hours  sodium chloride 0.9%. 1000 milliLiter(s) (125 mL/Hr) IV Continuous <Continuous>    MEDICATIONS  (PRN):  acetaminophen     Tablet .. 650 milliGRAM(s) Oral every 6 hours PRN Temp greater or equal to 38C (100.4F), Mild Pain (1 - 3)  aluminum hydroxide/magnesium hydroxide/simethicone Suspension 30 milliLiter(s) Oral every 4 hours PRN Dyspepsia  melatonin 3 milliGRAM(s) Oral at bedtime PRN Insomnia  ondansetron Injectable 4 milliGRAM(s) IV Push every 8 hours PRN Nausea and/or Vomiting      VITALS:  T(F): 97.6 (23 @ 16:42), Max: 99.6 (23 @ 21:54)  HR: 81 (23 @ 16:42) (81 - 120)  BP: 128/77 (23 @ 16:42) (128/77 - 165/94)  RR: 18 (23 @ 16:42) (17 - 18)  SpO2: 92% (23 @ 16:42) (92% - 95%)  Wt(kg): --    I&O's Summary      CAPILLARY BLOOD GLUCOSE          PHYSICAL EXAM:  Gen: NAD  HEENT:  pupils equal and reactive, EOMI, no oropharyngeal lesions, erythema, exudates, oral thrush  NECK:   supple, no carotid bruits, no palpable lymph nodes, no thyromegaly  CV:  +S1, +S2, regular, no murmurs or rubs  RESP:   lungs clear to auscultation bilaterally, no wheezing, rales, rhonchi, good air entry bilaterally  BREAST:  not examined  GI:  abdomen soft, +tender, non-distended, normal BS, no bruits, no abdominal masses, no palpable masses  RECTAL:  not examined  :  not examined  MSK:   normal muscle tone, no atrophy, no rigidity, no contractions  EXT:  no clubbing, no cyanosis, no edema, no calf pain, swelling or erythema  VASCULAR:  pulses equal and symmetric in the upper and lower extremities  NEURO:  AAOX3, no focal neurological deficits, follows all commands, able to move extremities spontaneously  SKIN:  no ulcers, lesions or rashes    LABS:                            13.0   23.92 )-----------( 215      ( 2023 08:27 )             39.7         141  |  115<H>  |  16  ----------------------------<  192<H>  2.9<LL>   |  16<L>  |  1.19    Ca    8.3<L>      2023 08:27  Mg     1.6         TPro  7.3  /  Alb  3.3  /  TBili  0.5  /  DBili  x   /  AST  23  /  ALT  20  /  AlkPhos  59  20        LIVER FUNCTIONS - ( 2023 16:05 )  Alb: 3.3 g/dL / Pro: 7.3 gm/dL / ALK PHOS: 59 U/L / ALT: 20 U/L / AST: 23 U/L / GGT: x             Urinalysis Basic - ( 2023 16:23 )    Color: Yellow / Appearance: Clear / S.010 / pH: x  Gluc: x / Ketone: Small  / Bili: Negative / Urobili: Negative   Blood: x / Protein: 30 mg/dL / Nitrite: Negative   Leuk Esterase: Negative / RBC: 3-5 /HPF / WBC 0-2 /HPF   Sq Epi: x / Non Sq Epi: x / Bacteria: Occasional        Lactate, Blood: 6.1 mmol/L ( @ 18:20)  Lactate, Blood: 4.6 mmol/L ( @ 11:52)  Lactate, Blood: 5.2 mmol/L ( @ 08:27)  Lactate, Blood: 6.6 mmol/L ( @ 02:17)  Lactate, Blood: 7.0 mmol/L ( @ 22:34)  Lactate, Blood: 7.0 mmol/L ( @ 22:18)        CULTURES:  BCx pending  UCx pending  GI PCR and Cdiff ordered    Additional results/Imaging, I have personally reviewed:  CT a/p w iv contrast, no oral contrast 23: No evidence for mechanical bowel obstruction. No colonic wall thickening. No free intraperitoneal air or fluid.    CXR 23:  1. Linear atelectasis in the left lower lobe is not present previously, while the remaining lungs are clear. 2. AV sequential pacemaker in situ with atherosclerotic aorta and no CHF, unchanged.    Echo 23: The left ventricle is normal in size, wall thickness, wall motion and contractility. Estimated left ventricular ejection fraction is 60-65 %. Normal appearing left atrium. The aortic valve is well visualized, appears mildly sclerotic. Valve opening seems to be normal. Mild mitral annular calcification is present. Moderate to severe (3+) mitral regurgitation is present. Normal appearing tricuspid valve structure. Moderate to severe (3+) tricuspid valve regurgitation is present. Normal appearing pulmonic valve structure. Trace pulmonic valvular regurgitation is present. The IVC has decreased respiratory variation.    Telemetry, personally reviewed:  23: afib 90-120s

## 2023-04-22 LAB
ALBUMIN SERPL ELPH-MCNC: 3 G/DL — LOW (ref 3.3–5)
ALP SERPL-CCNC: 45 U/L — SIGNIFICANT CHANGE UP (ref 40–120)
ALT FLD-CCNC: 29 U/L — SIGNIFICANT CHANGE UP (ref 12–78)
ANION GAP SERPL CALC-SCNC: 7 MMOL/L — SIGNIFICANT CHANGE UP (ref 5–17)
AST SERPL-CCNC: 51 U/L — HIGH (ref 15–37)
BILIRUB SERPL-MCNC: 0.5 MG/DL — SIGNIFICANT CHANGE UP (ref 0.2–1.2)
BUN SERPL-MCNC: 18 MG/DL — SIGNIFICANT CHANGE UP (ref 7–23)
CALCIUM SERPL-MCNC: 8.4 MG/DL — LOW (ref 8.5–10.1)
CHLORIDE SERPL-SCNC: 120 MMOL/L — HIGH (ref 96–108)
CO2 SERPL-SCNC: 15 MMOL/L — LOW (ref 22–31)
CREAT SERPL-MCNC: 1.14 MG/DL — SIGNIFICANT CHANGE UP (ref 0.5–1.3)
CULTURE RESULTS: SIGNIFICANT CHANGE UP
EGFR: 44 ML/MIN/1.73M2 — LOW
GLUCOSE SERPL-MCNC: 189 MG/DL — HIGH (ref 70–99)
HCT VFR BLD CALC: 38.1 % — SIGNIFICANT CHANGE UP (ref 34.5–45)
HGB BLD-MCNC: 12.7 G/DL — SIGNIFICANT CHANGE UP (ref 11.5–15.5)
LACTATE SERPL-SCNC: 3.3 MMOL/L — HIGH (ref 0.7–2)
MAGNESIUM SERPL-MCNC: 1.8 MG/DL — SIGNIFICANT CHANGE UP (ref 1.6–2.6)
MCHC RBC-ENTMCNC: 30.9 PG — SIGNIFICANT CHANGE UP (ref 27–34)
MCHC RBC-ENTMCNC: 33.3 GM/DL — SIGNIFICANT CHANGE UP (ref 32–36)
MCV RBC AUTO: 92.7 FL — SIGNIFICANT CHANGE UP (ref 80–100)
PLATELET # BLD AUTO: 173 K/UL — SIGNIFICANT CHANGE UP (ref 150–400)
POTASSIUM SERPL-MCNC: 3.5 MMOL/L — SIGNIFICANT CHANGE UP (ref 3.5–5.3)
POTASSIUM SERPL-SCNC: 3.5 MMOL/L — SIGNIFICANT CHANGE UP (ref 3.5–5.3)
PROT SERPL-MCNC: 6.6 GM/DL — SIGNIFICANT CHANGE UP (ref 6–8.3)
RBC # BLD: 4.11 M/UL — SIGNIFICANT CHANGE UP (ref 3.8–5.2)
RBC # FLD: 12.9 % — SIGNIFICANT CHANGE UP (ref 10.3–14.5)
SODIUM SERPL-SCNC: 142 MMOL/L — SIGNIFICANT CHANGE UP (ref 135–145)
SPECIMEN SOURCE: SIGNIFICANT CHANGE UP
WBC # BLD: 17.6 K/UL — HIGH (ref 3.8–10.5)
WBC # FLD AUTO: 17.6 K/UL — HIGH (ref 3.8–10.5)

## 2023-04-22 PROCEDURE — 99233 SBSQ HOSP IP/OBS HIGH 50: CPT

## 2023-04-22 PROCEDURE — 99232 SBSQ HOSP IP/OBS MODERATE 35: CPT

## 2023-04-22 PROCEDURE — 71045 X-RAY EXAM CHEST 1 VIEW: CPT | Mod: 26

## 2023-04-22 RX ORDER — MORPHINE SULFATE 50 MG/1
2 CAPSULE, EXTENDED RELEASE ORAL
Refills: 0 | Status: DISCONTINUED | OUTPATIENT
Start: 2023-04-22 | End: 2023-04-24

## 2023-04-22 RX ORDER — POTASSIUM CHLORIDE 20 MEQ
40 PACKET (EA) ORAL ONCE
Refills: 0 | Status: COMPLETED | OUTPATIENT
Start: 2023-04-22 | End: 2023-04-22

## 2023-04-22 RX ADMIN — Medication 0.1 MILLIGRAM(S): at 15:14

## 2023-04-22 RX ADMIN — Medication 0.1 MILLIGRAM(S): at 05:53

## 2023-04-22 RX ADMIN — Medication 40 MILLIEQUIVALENT(S): at 12:12

## 2023-04-22 RX ADMIN — Medication 100 MILLIGRAM(S): at 09:52

## 2023-04-22 RX ADMIN — PIPERACILLIN AND TAZOBACTAM 25 GRAM(S): 4; .5 INJECTION, POWDER, LYOPHILIZED, FOR SOLUTION INTRAVENOUS at 23:27

## 2023-04-22 RX ADMIN — ESCITALOPRAM OXALATE 10 MILLIGRAM(S): 10 TABLET, FILM COATED ORAL at 09:52

## 2023-04-22 RX ADMIN — MORPHINE SULFATE 2 MILLIGRAM(S): 50 CAPSULE, EXTENDED RELEASE ORAL at 21:36

## 2023-04-22 RX ADMIN — AMLODIPINE BESYLATE 10 MILLIGRAM(S): 2.5 TABLET ORAL at 09:52

## 2023-04-22 RX ADMIN — PIPERACILLIN AND TAZOBACTAM 25 GRAM(S): 4; .5 INJECTION, POWDER, LYOPHILIZED, FOR SOLUTION INTRAVENOUS at 14:30

## 2023-04-22 RX ADMIN — PIPERACILLIN AND TAZOBACTAM 25 GRAM(S): 4; .5 INJECTION, POWDER, LYOPHILIZED, FOR SOLUTION INTRAVENOUS at 05:53

## 2023-04-22 RX ADMIN — Medication 81 MILLIGRAM(S): at 09:52

## 2023-04-22 RX ADMIN — Medication 0.1 MILLIGRAM(S): at 23:28

## 2023-04-22 RX ADMIN — MIRTAZAPINE 15 MILLIGRAM(S): 45 TABLET, ORALLY DISINTEGRATING ORAL at 23:28

## 2023-04-22 NOTE — PROGRESS NOTE ADULT - PROBLEM SELECTOR PLAN 1
Elevated troponin likely related to sepsis,  pt. poor historian with h/o dementia unable to provide answers about clinical symptoms  ECG with mild ST segment depression Echo LV normal in size, wall thickness, wall motion and  contractility EF 60-65% Moderate-severe MR/TR  Continue Beta Blocker/ aspirin/statin Elevated troponin likely related to sepsis,  pt. poor historian with h/o dementia unable to provide answers about clinical symptoms  ECG with mild ST segment depression Echo LV normal in size, wall thickness, wall motion and  contractility EF 60-65% Moderate-severe MR/TR  Continue Beta Blocker/ aspirin/statin  PPM interrogation

## 2023-04-22 NOTE — PROGRESS NOTE ADULT - SUBJECTIVE AND OBJECTIVE BOX
HOSPITALIST ATTENDING PROGRESS NOTE    Chart and meds reviewed.  Patient seen and examined.    CC: diarrhea    Subjective: Pt denies pain however TTP abdmn. Increased RR. Updated daughter, now wishing to pursue comfort care.     All other systems reviewed and found to be negative with the exception of what has been described above.    MEDICATIONS  (STANDING):  cloNIDine 0.1 milliGRAM(s) Oral three times a day  escitalopram 10 milliGRAM(s) Oral daily  metoprolol succinate  milliGRAM(s) Oral daily  mirtazapine 15 milliGRAM(s) Oral at bedtime  piperacillin/tazobactam IVPB.. 3.375 Gram(s) IV Intermittent every 8 hours    MEDICATIONS  (PRN):  acetaminophen     Tablet .. 650 milliGRAM(s) Oral every 6 hours PRN Temp greater or equal to 38C (100.4F), Mild Pain (1 - 3)  aluminum hydroxide/magnesium hydroxide/simethicone Suspension 30 milliLiter(s) Oral every 4 hours PRN Dyspepsia  melatonin 3 milliGRAM(s) Oral at bedtime PRN Insomnia  morphine  - Injectable 2 milliGRAM(s) IV Push every 2 hours PRN Moderate pain (4-6), Severe pain (7-10), Respiratory rate greater than 22  ondansetron Injectable 4 milliGRAM(s) IV Push every 8 hours PRN Nausea and/or Vomiting      VITALS:  T(F): 97.1 (04-22-23 @ 15:47), Max: 98 (04-21-23 @ 21:12)  HR: 86 (04-22-23 @ 15:47) (81 - 99)  BP: 136/61 (04-22-23 @ 15:47) (115/80 - 149/74)  RR: 22 (04-22-23 @ 15:47) (18 - 22)  SpO2: 94% (04-22-23 @ 15:47) (92% - 94%)  Wt(kg): --    I&O's Summary    21 Apr 2023 07:01  -  22 Apr 2023 07:00  --------------------------------------------------------  IN: 0 mL / OUT: 200 mL / NET: -200 mL        CAPILLARY BLOOD GLUCOSE          PHYSICAL EXAM:  Gen: NAD  HEENT:  pupils equal and reactive, EOMI, no oropharyngeal lesions, erythema, exudates, oral thrush  NECK:   supple, no carotid bruits, no palpable lymph nodes, no thyromegaly  CV:  +S1, +S2, regular, no murmurs or rubs  RESP:   lungs clear to auscultation bilaterally, no wheezing, rales, rhonchi, good air entry bilaterally  BREAST:  not examined  GI:  abdomen soft, +TTP, non-distended, normal BS, no bruits, no abdominal masses, no palpable masses  RECTAL:  not examined  :  not examined  MSK:   normal muscle tone, no atrophy, no rigidity, no contractions  EXT:  no clubbing, no cyanosis, no edema, no calf pain, swelling or erythema  VASCULAR:  pulses equal and symmetric in the upper and lower extremities  NEURO:  AAOX1, no focal neurological deficits, follows all commands, able to move extremities spontaneously  SKIN:  no ulcers, lesions or rashes    LABS:                            12.7   17.60 )-----------( 173      ( 22 Apr 2023 07:52 )             38.1     04-22    142  |  120<H>  |  18  ----------------------------<  189<H>  3.5   |  15<L>  |  1.14    Ca    8.4<L>      22 Apr 2023 07:52  Mg     1.8     04-22    TPro  6.6  /  Alb  3.0<L>  /  TBili  0.5  /  DBili  x   /  AST  51<H>  /  ALT  29  /  AlkPhos  45  04-22        LIVER FUNCTIONS - ( 22 Apr 2023 07:52 )  Alb: 3.0 g/dL / Pro: 6.6 gm/dL / ALK PHOS: 45 U/L / ALT: 29 U/L / AST: 51 U/L / GGT: x                 Lactate, Blood: 3.3 mmol/L (04-22 @ 07:52)  Lactate, Blood: 6.1 mmol/L (04-21 @ 18:20)    CULTURES:  BCx NG  UCx >3org    Additional results/Imaging, I have personally reviewed:  CT a/p w iv contrast, no oral contrast 4/20/23: No evidence for mechanical bowel obstruction. No colonic wall thickening. No free intraperitoneal air or fluid.    CXR 4/20/23:  1. Linear atelectasis in the left lower lobe is not present previously, while the remaining lungs are clear. 2. AV sequential pacemaker in situ with atherosclerotic aorta and no CHF, unchanged.    Echo 4/21/23: The left ventricle is normal in size, wall thickness, wall motion and contractility. Estimated left ventricular ejection fraction is 60-65 %. Normal appearing left atrium. The aortic valve is well visualized, appears mildly sclerotic. Valve opening seems to be normal. Mild mitral annular calcification is present. Moderate to severe (3+) mitral regurgitation is present. Normal appearing tricuspid valve structure. Moderate to severe (3+) tricuspid valve regurgitation is present. Normal appearing pulmonic valve structure. Trace pulmonic valvular regurgitation is present. The IVC has decreased respiratory variation.

## 2023-04-22 NOTE — PROGRESS NOTE ADULT - NS ATTEND AMEND GEN_ALL_CORE FT
Overall prognosis poor - predominant acute illness is non-cardiac.  TTE reveals normal LV function + mod-sev MR/TR.

## 2023-04-22 NOTE — PROGRESS NOTE ADULT - SUBJECTIVE AND OBJECTIVE BOX
CHIEF COMPLAINT: No complaint -- dementia/ confusion precludes data    HPI:  89 year old woman with a history of dementia, HTN, HLD, DM, PPM, who presented to the ED for the evaluation of diarrhea and tachycardia associated with increased fatigue, nausea, chills.  Mrs Benavidez is a poor informant due to her dementia and cannot provide any history of present illness -- she is unable to answer basic questions about symptoms.  Upon admission she was found to have lactic acidosis, hyperglycemia, renal insufficiency, leukocytosis; cardiology consult called for elevated serum troponin.  4/22/23: ill appearing and lethargic tele SR/APC's     PAST MEDICAL & SURGICAL HISTORY:  HTN (hypertension)  Dementia  HLD (hyperlipidemia)  DM2 (diabetes mellitus, type 2)  Artificial pacemaker    SOCIAL HISTORY: Unable to obtain from patient due to dementia    FAMILY HISTORY: Unable to obtain from patient due to dementia    MEDICATIONS  (STANDING):  amLODIPine   Tablet 10 milliGRAM(s) Oral daily  aspirin  chewable 81 milliGRAM(s) Oral daily  atorvastatin 10 milliGRAM(s) Oral at bedtime  cloNIDine 0.1 milliGRAM(s) Oral three times a day  escitalopram 10 milliGRAM(s) Oral daily  metoprolol succinate  milliGRAM(s) Oral daily  mirtazapine 15 milliGRAM(s) Oral at bedtime  piperacillin/tazobactam IVPB.. 3.375 Gram(s) IV Intermittent every 8 hours  potassium chloride   Powder 40 milliEquivalent(s) Oral once    MEDICATIONS  (PRN):  acetaminophen     Tablet .. 650 milliGRAM(s) Oral every 6 hours PRN Temp greater or equal to 38C (100.4F), Mild Pain (1 - 3)  aluminum hydroxide/magnesium hydroxide/simethicone Suspension 30 milliLiter(s) Oral every 4 hours PRN Dyspepsia  melatonin 3 milliGRAM(s) Oral at bedtime PRN Insomnia  ondansetron Injectable 4 milliGRAM(s) IV Push every 8 hours PRN Nausea and/or Vomiting        Allergies:  No Known Allergies      Vital Signs Last 24 Hrs  T(C): 36.6 (22 Apr 2023 08:30), Max: 36.7 (21 Apr 2023 21:12)  T(F): 97.9 (22 Apr 2023 08:30), Max: 98 (21 Apr 2023 21:12)  HR: 91 (22 Apr 2023 08:30) (81 - 99)  BP: 142/66 (22 Apr 2023 08:30) (115/80 - 149/74)  BP(mean): --  RR: 22 (22 Apr 2023 08:30) (18 - 22)  SpO2: 94% (22 Apr 2023 08:30) (92% - 94%)    Parameters below as of 22 Apr 2023 08:30  Patient On (Oxygen Delivery Method): nasal cannula  O2 Flow (L/min): 4      PHYSICAL EXAM:  Constitutional: Awake but fatigued and Ill-appearing, unable to respond to questions  HEENT:  No oral cyanosis.  Pulmonary: Tachypneic, breath sounds are clear bilaterally but diminshed  Cardiovascular: S1 and S2, regular,   Gastrointestinal: Abd soft,   Lymph: No LE Edema  Neurological: Moves all extremities, confused      LABS:                     13.0   23.92 )-----------( 215      ( 21 Apr 2023 08:27 )             39.7              141    |  115    |  16     ----------------------------<  192    2.9     |  16     |  1.19     Ca    8.3        21 Apr 2023 08:27  Mg     1.6       21 Apr 2023 08:27    TPro  7.3    /  Alb  3.3    /  TBili  0.5    /  DBili  x      /  AST  23     /  ALT  20     /  AlkPhos  59     20 Apr 2023 16:05    TroponinI hsT: <-738.82, <-312.83, <-53.71    ECG (4/21/23):  Sinus tachycardia with 1st degree AV block with PAC, nonspecific ST abnormality    CT Abdomen and Pelvis w/ IV Cont (04.20.23 @ 15:40):  No evidence for mechanical bowel obstruction. No colonic wall thickening. No free intraperitoneal air or fluid.    Xray Chest 1 View- PORTABLE-Urgent (Xray Chest 1 View- PORTABLE-urgent .) (04.20.23 @ 16:09):  1. Linear atelectasis in the left lower lobe is not present previously, while the remaining lungs are clear.  2. AV sequential pacemaker in situ with atherosclerotic aorta and no CHF, unchanged.      Summary     The left ventricle is normal in size, wall thickness, wall motion and   contractility.   Estimated left ventricular ejection fraction is 60-65 %.   Normal appearing left atrium.   The aortic valve is well visualized, appears mildly sclerotic. Valve   opening seems to be normal.   Mild mitral annular calcification is present.   Moderate to severe (3+) mitral regurgitation is present.   Normal appearing tricuspid valve structure.   Moderate to severe (3+) tricuspid valve regurgitation is present.   Normal appearing pulmonic valve structure.   Trace pulmonic valvular regurgitation is present.   The IVC has decreased respiratory variation.     Signature     ----------------------------------------------------------------   Electronically signed by Charli Aguilar MD(Interpreting   physician) on 04/21/2023 05:18 PM     CHIEF COMPLAINT: No complaint -- dementia/ confusion precludes data    HPI:  89 year old woman with a history of dementia, HTN, HLD, DM, PPM, who presented to the ED for the evaluation of diarrhea and tachycardia associated with increased fatigue, nausea, chills.  Mrs Benavidez is a poor informant due to her dementia and cannot provide any history of present illness -- she is unable to answer basic questions about symptoms.  Upon admission she was found to have lactic acidosis, hyperglycemia, renal insufficiency, leukocytosis; cardiology consult called for elevated serum troponin.    4/22/23: ill appearing and lethargic tele SR/APC's     MEDICATIONS  (STANDING):  amLODIPine   Tablet 10 milliGRAM(s) Oral daily  aspirin  chewable 81 milliGRAM(s) Oral daily  atorvastatin 10 milliGRAM(s) Oral at bedtime  cloNIDine 0.1 milliGRAM(s) Oral three times a day  escitalopram 10 milliGRAM(s) Oral daily  metoprolol succinate  milliGRAM(s) Oral daily  mirtazapine 15 milliGRAM(s) Oral at bedtime  piperacillin/tazobactam IVPB.. 3.375 Gram(s) IV Intermittent every 8 hours  potassium chloride   Powder 40 milliEquivalent(s) Oral once    MEDICATIONS  (PRN):  acetaminophen     Tablet .. 650 milliGRAM(s) Oral every 6 hours PRN Temp greater or equal to 38C (100.4F), Mild Pain (1 - 3)  aluminum hydroxide/magnesium hydroxide/simethicone Suspension 30 milliLiter(s) Oral every 4 hours PRN Dyspepsia  melatonin 3 milliGRAM(s) Oral at bedtime PRN Insomnia  ondansetron Injectable 4 milliGRAM(s) IV Push every 8 hours PRN Nausea and/or Vomiting    Vital Signs Last 24 Hrs  T(C): 36.6 (22 Apr 2023 08:30), Max: 36.7 (21 Apr 2023 21:12)  T(F): 97.9 (22 Apr 2023 08:30), Max: 98 (21 Apr 2023 21:12)  HR: 91 (22 Apr 2023 08:30) (81 - 99)  BP: 142/66 (22 Apr 2023 08:30) (115/80 - 149/74)  RR: 22 (22 Apr 2023 08:30) (18 - 22)  SpO2: 94% (22 Apr 2023 08:30) (92% - 94%)  Patient On (Oxygen Delivery Method): nasal cannula O2 Flow (L/min): 4    PHYSICAL EXAM:  Constitutional: Awake but fatigued and Ill-appearing, unable to respond to questions  HEENT:  No oral cyanosis.  Pulmonary: Tachypneic, breath sounds are clear bilaterally but diminshed  Cardiovascular: S1 and S2, regular,   Gastrointestinal: Abd soft,   Lymph: No LE Edema  Neurological: Moves all extremities, confused      LABS:                     13.0   23.92 )-----------( 215      ( 21 Apr 2023 08:27 )             39.7              141    |  115    |  16     ----------------------------<  192    2.9     |  16     |  1.19     Ca    8.3        21 Apr 2023 08:27  Mg     1.6       21 Apr 2023 08:27    TPro  7.3    /  Alb  3.3    /  TBili  0.5    /  DBili  x      /  AST  23     /  ALT  20     /  AlkPhos  59     20 Apr 2023 16:05    TroponinI hsT: <-738.82, <-312.83, <-53.71    ECG (4/21/23):  Sinus tachycardia with 1st degree AV block with PAC, nonspecific ST abnormality    CT Abdomen and Pelvis w/ IV Cont (04.20.23 @ 15:40):  No evidence for mechanical bowel obstruction. No colonic wall thickening. No free intraperitoneal air or fluid.    Xray Chest 1 View- PORTABLE-Urgent (Xray Chest 1 View- PORTABLE-urgent .) (04.20.23 @ 16:09):  1. Linear atelectasis in the left lower lobe is not present previously, while the remaining lungs are clear.  2. AV sequential pacemaker in situ with atherosclerotic aorta and no CHF, unchanged.    TTE:   The left ventricle is normal in size, wall thickness, wall motion and contractility. Estimated left ventricular ejection fraction is 60-65 %.   Normal appearing left atrium.   The aortic valve is well visualized, appears mildly sclerotic. Valve opening seems to be normal.   Mild mitral annular calcification is present.  Moderate to severe (3+) mitral regurgitation is present.   Normal appearing tricuspid valve structure. Moderate to severe (3+) tricuspid valve regurgitation is present.   Normal appearing pulmonic valve structure.   Trace pulmonic valvular regurgitation is present.   The IVC has decreased respiratory variation.

## 2023-04-23 PROCEDURE — 99233 SBSQ HOSP IP/OBS HIGH 50: CPT

## 2023-04-23 RX ADMIN — PIPERACILLIN AND TAZOBACTAM 25 GRAM(S): 4; .5 INJECTION, POWDER, LYOPHILIZED, FOR SOLUTION INTRAVENOUS at 22:15

## 2023-04-23 RX ADMIN — MORPHINE SULFATE 2 MILLIGRAM(S): 50 CAPSULE, EXTENDED RELEASE ORAL at 10:18

## 2023-04-23 RX ADMIN — MORPHINE SULFATE 2 MILLIGRAM(S): 50 CAPSULE, EXTENDED RELEASE ORAL at 10:33

## 2023-04-23 RX ADMIN — Medication 100 MILLIGRAM(S): at 10:15

## 2023-04-23 RX ADMIN — PIPERACILLIN AND TAZOBACTAM 25 GRAM(S): 4; .5 INJECTION, POWDER, LYOPHILIZED, FOR SOLUTION INTRAVENOUS at 14:21

## 2023-04-23 RX ADMIN — MIRTAZAPINE 15 MILLIGRAM(S): 45 TABLET, ORALLY DISINTEGRATING ORAL at 22:15

## 2023-04-23 RX ADMIN — MORPHINE SULFATE 2 MILLIGRAM(S): 50 CAPSULE, EXTENDED RELEASE ORAL at 03:16

## 2023-04-23 RX ADMIN — Medication 0.1 MILLIGRAM(S): at 22:15

## 2023-04-23 RX ADMIN — Medication 0.1 MILLIGRAM(S): at 14:21

## 2023-04-23 RX ADMIN — MORPHINE SULFATE 2 MILLIGRAM(S): 50 CAPSULE, EXTENDED RELEASE ORAL at 23:00

## 2023-04-23 RX ADMIN — Medication 0.1 MILLIGRAM(S): at 06:14

## 2023-04-23 RX ADMIN — MORPHINE SULFATE 2 MILLIGRAM(S): 50 CAPSULE, EXTENDED RELEASE ORAL at 22:23

## 2023-04-23 RX ADMIN — PIPERACILLIN AND TAZOBACTAM 25 GRAM(S): 4; .5 INJECTION, POWDER, LYOPHILIZED, FOR SOLUTION INTRAVENOUS at 06:15

## 2023-04-23 RX ADMIN — ESCITALOPRAM OXALATE 10 MILLIGRAM(S): 10 TABLET, FILM COATED ORAL at 10:14

## 2023-04-23 NOTE — PROGRESS NOTE ADULT - REASON FOR ADMISSION
Diarrhea, Dehydration and Lactic Acidosis

## 2023-04-23 NOTE — PROGRESS NOTE ADULT - SUBJECTIVE AND OBJECTIVE BOX
HOSPITALIST ATTENDING PROGRESS NOTE    Chart and meds reviewed.  Patient seen and examined.    CC: diarrhea    Subjective: Awake, denies CP, SOB, abdnl pain. Updated family at bedside.     All other systems reviewed and found to be negative with the exception of what has been described above.    MEDICATIONS  (STANDING):  cloNIDine 0.1 milliGRAM(s) Oral three times a day  escitalopram 10 milliGRAM(s) Oral daily  metoprolol succinate  milliGRAM(s) Oral daily  mirtazapine 15 milliGRAM(s) Oral at bedtime  piperacillin/tazobactam IVPB.. 3.375 Gram(s) IV Intermittent every 8 hours    MEDICATIONS  (PRN):  acetaminophen     Tablet .. 650 milliGRAM(s) Oral every 6 hours PRN Temp greater or equal to 38C (100.4F), Mild Pain (1 - 3)  aluminum hydroxide/magnesium hydroxide/simethicone Suspension 30 milliLiter(s) Oral every 4 hours PRN Dyspepsia  melatonin 3 milliGRAM(s) Oral at bedtime PRN Insomnia  morphine  - Injectable 2 milliGRAM(s) IV Push every 2 hours PRN Moderate pain (4-6), Severe pain (7-10), Respiratory rate greater than 22  ondansetron Injectable 4 milliGRAM(s) IV Push every 8 hours PRN Nausea and/or Vomiting      VITALS:  T(F): 97.5 (04-23-23 @ 08:30), Max: 97.9 (04-22-23 @ 21:16)  HR: 82 (04-23-23 @ 08:30) (82 - 92)  BP: 164/63 (04-23-23 @ 08:30) (140/68 - 164/63)  RR: 18 (04-23-23 @ 08:30) (18 - 19)  SpO2: 92% (04-23-23 @ 08:30) (92% - 94%)  Wt(kg): --    I&O's Summary    22 Apr 2023 07:01  -  23 Apr 2023 07:00  --------------------------------------------------------  IN: 0 mL / OUT: 450 mL / NET: -450 mL        CAPILLARY BLOOD GLUCOSE          PHYSICAL EXAM:  Gen: NAD  HEENT:  pupils equal and reactive, EOMI, no oropharyngeal lesions, erythema, exudates, oral thrush  NECK:   supple, no carotid bruits, no palpable lymph nodes, no thyromegaly  CV:  +S1, +S2, regular, no murmurs or rubs  RESP:   lungs clear to auscultation bilaterally, no wheezing, rales, rhonchi, good air entry bilaterally  BREAST:  not examined  GI:  abdomen soft, non-tender, non-distended, normal BS, no bruits, no abdominal masses, no palpable masses  RECTAL:  not examined  :  not examined  MSK:   normal muscle tone, no atrophy, no rigidity, no contractions  EXT:  no clubbing, no cyanosis, no edema, no calf pain, swelling or erythema  VASCULAR:  pulses equal and symmetric in the upper and lower extremities  NEURO:  AAOX3, no focal neurological deficits, follows all commands, able to move extremities spontaneously  SKIN:  no ulcers, lesions or rashes    LABS:                            12.7   17.60 )-----------( 173      ( 22 Apr 2023 07:52 )             38.1     04-22    142  |  120<H>  |  18  ----------------------------<  189<H>  3.5   |  15<L>  |  1.14    Ca    8.4<L>      22 Apr 2023 07:52  Mg     1.8     04-22    TPro  6.6  /  Alb  3.0<L>  /  TBili  0.5  /  DBili  x   /  AST  51<H>  /  ALT  29  /  AlkPhos  45  04-22        LIVER FUNCTIONS - ( 22 Apr 2023 07:52 )  Alb: 3.0 g/dL / Pro: 6.6 gm/dL / ALK PHOS: 45 U/L / ALT: 29 U/L / AST: 51 U/L / GGT: x           CULTURES:  BCx NG  UCx >3org    Additional results/Imaging, I have personally reviewed:  CT a/p w iv contrast, no oral contrast 4/20/23: No evidence for mechanical bowel obstruction. No colonic wall thickening. No free intraperitoneal air or fluid.    CXR 4/20/23:  1. Linear atelectasis in the left lower lobe is not present previously, while the remaining lungs are clear. 2. AV sequential pacemaker in situ with atherosclerotic aorta and no CHF, unchanged.    Echo 4/21/23: The left ventricle is normal in size, wall thickness, wall motion and contractility. Estimated left ventricular ejection fraction is 60-65 %. Normal appearing left atrium. The aortic valve is well visualized, appears mildly sclerotic. Valve opening seems to be normal. Mild mitral annular calcification is present. Moderate to severe (3+) mitral regurgitation is present. Normal appearing tricuspid valve structure. Moderate to severe (3+) tricuspid valve regurgitation is present. Normal appearing pulmonic valve structure. Trace pulmonic valvular regurgitation is present. The IVC has decreased respiratory variation.

## 2023-04-23 NOTE — PROGRESS NOTE ADULT - ASSESSMENT
89 year old woman with a history of dementia, HTN, HLD, DM, PPM, who presented to the ED for the evaluation of diarrhea and tachycardia associated with increased fatigue, nausea, chills, found to have severe sepsis w lactic acidosis and demand ischemia.    #Severe sepsis w lactic acidosis  -likely GI source given sx  -UA neg  -BCx pending  -GI PCR and C diff pending  -trend lactate  -CXR neg  -CT a/p w iv contrast but w no oral contrast unremarkable  -continue IVF  -Zosyn  -surgery consulted- c/f ischemic bowel given persistently elevated lactate    #Elevated troponin, likely demand ischemia  -trop now plateaud  -echo as above  -ASA  -f/u cards recs    #HypoK  -likely GI losses  -replete and trend    #ERICA  -likely prerenal, improving  -trend    #?afib on tele  -need to clarify, not on EKGs and no hx of same    #DVT ppx- SCDs    #GOC- Discussed w son. DNR/DNI confirmed, MOLST updated. Prognosis guarded, high risk for morbidity and mortality. 
89 year old woman with a history of dementia, HTN, HLD, DM, PPM, who presented to the ED for the evaluation of diarrhea and tachycardia associated with increased fatigue, nausea, chills, found to have severe sepsis w lactic acidosis and demand ischemia.    Now for comfort care, home hospice referral initiated, appreciate DANAE barfield.     #Severe sepsis w lactic acidosis  -likely GI source given sx  -UA neg  -BCx NG  -lactate elevated  -CXR neg  -CT a/p w iv contrast but w no oral contrast unremarkable  -Zosyn  -morphine for pain control    #Acute hypoxic resp failure  -likely pulmonary edema in setting of aggressive volume resuscitation  -O2 prn  -morphine for increased WOB    #Elevated troponin, likely demand ischemia  -trop now plateaud  -echo as above  -ASA, now stopped given given  -appreciate cards input    #HypoK  -likely GI losses  -repleted    #ERICA  -likely prerenal    #DVT ppx- SCDs    #GOC- DNR/DNI. Comfort care, awaiting home hospice. Accepted by [facility], awaiting hospital bed and home O2.     
89 year old woman with a history of dementia, HTN, HLD, DM, PPM, who presented to the ED for the evaluation of diarrhea and tachycardia associated with increased fatigue, nausea, chills, found to have severe sepsis w lactic acidosis and demand ischemia.    Now for comfort care, home hospice referral initiated, appreciate DANAE barfield.     #Severe sepsis w lactic acidosis  -likely GI source given sx  -UA neg  -BCx NG  -lactate elevated  -CXR neg  -CT a/p w iv contrast but w no oral contrast unremarkable  -Zosyn  -morphine for pain control    #Acute hypoxic resp failure  -likely pulmonary edema in setting of aggressive volume resuscitation  -O2 prn  -morphine for increased WOB    #Elevated troponin, likely demand ischemia  -trop now plateaud  -echo as above  -ASA, now stopped given given  -appreciate cards input    #HypoK  -likely GI losses  -repleted    #ERICA  -likely prerenal    #DVT ppx- SCDs    #GOC- DNR/DNI. Extensive convo w daughter (alternate HCP, [place], [phone]). Son is on airplane and not reachable. Now plan for comfort care with desire for pt to go home. Home hospice referral initiated. Comfort care order set active.

## 2023-04-24 ENCOUNTER — TRANSCRIPTION ENCOUNTER (OUTPATIENT)
Age: 88
End: 2023-04-24

## 2023-04-24 VITALS
TEMPERATURE: 98 F | SYSTOLIC BLOOD PRESSURE: 152 MMHG | OXYGEN SATURATION: 94 % | HEART RATE: 70 BPM | DIASTOLIC BLOOD PRESSURE: 55 MMHG

## 2023-04-24 PROCEDURE — 99239 HOSP IP/OBS DSCHRG MGMT >30: CPT

## 2023-04-24 RX ORDER — HYOSCYAMINE SULFATE 0.13 MG
1 TABLET ORAL
Qty: 12 | Refills: 0
Start: 2023-04-24

## 2023-04-24 RX ORDER — AMLODIPINE BESYLATE 2.5 MG/1
1 TABLET ORAL
Refills: 0 | DISCHARGE

## 2023-04-24 RX ORDER — MORPHINE SULFATE 50 MG/1
0.25 CAPSULE, EXTENDED RELEASE ORAL
Qty: 3 | Refills: 0
Start: 2023-04-24

## 2023-04-24 RX ORDER — ATORVASTATIN CALCIUM 80 MG/1
1 TABLET, FILM COATED ORAL
Qty: 0 | Refills: 0 | DISCHARGE

## 2023-04-24 RX ORDER — LOSARTAN POTASSIUM 100 MG/1
1 TABLET, FILM COATED ORAL
Qty: 0 | Refills: 0 | DISCHARGE

## 2023-04-24 RX ADMIN — PIPERACILLIN AND TAZOBACTAM 25 GRAM(S): 4; .5 INJECTION, POWDER, LYOPHILIZED, FOR SOLUTION INTRAVENOUS at 14:06

## 2023-04-24 RX ADMIN — Medication 0.1 MILLIGRAM(S): at 05:47

## 2023-04-24 RX ADMIN — PIPERACILLIN AND TAZOBACTAM 25 GRAM(S): 4; .5 INJECTION, POWDER, LYOPHILIZED, FOR SOLUTION INTRAVENOUS at 05:47

## 2023-04-24 RX ADMIN — Medication 100 MILLIGRAM(S): at 09:56

## 2023-04-24 RX ADMIN — MORPHINE SULFATE 2 MILLIGRAM(S): 50 CAPSULE, EXTENDED RELEASE ORAL at 05:48

## 2023-04-24 RX ADMIN — MORPHINE SULFATE 2 MILLIGRAM(S): 50 CAPSULE, EXTENDED RELEASE ORAL at 06:15

## 2023-04-24 RX ADMIN — Medication 0.1 MILLIGRAM(S): at 14:07

## 2023-04-24 RX ADMIN — ESCITALOPRAM OXALATE 10 MILLIGRAM(S): 10 TABLET, FILM COATED ORAL at 09:57

## 2023-04-24 NOTE — DISCHARGE NOTE PROVIDER - CARE PROVIDER_API CALL
Oksana Pinedo)  Internal Medicine  [address] [place] [address]  Phone: [phone]  Fax: [phone]  Follow Up Time: 1 week

## 2023-04-24 NOTE — DISCHARGE NOTE NURSING/CASE MANAGEMENT/SOCIAL WORK - PATIENT PORTAL LINK FT
You can access the FollowMyHealth Patient Portal offered by [facility] by registering at the following website: [email or web address]. By joining [employer]’s FollowMyHealth portal, you will also be able to view your health information using other applications (apps) compatible with our system.

## 2023-04-24 NOTE — DISCHARGE NOTE PROVIDER - HOSPITAL COURSE
CC: diarrhea  HPI and Hospital Course: 89 year old woman with a history of dementia, HTN, HLD, DM, PPM, who presented to the ED for the evaluation of diarrhea and tachycardia associated with increased fatigue, nausea, chills, found to have severe sepsis w lactic acidosis and demand ischemia.    Now for comfort care, d/c w home hospice.     VITALS:  T(F): 98.5 (04-24-23 @ 14:04), Max: 98.5 (04-24-23 @ 14:04)  HR: 70 (04-24-23 @ 14:04) (61 - 93)  BP: 152/55 (04-24-23 @ 14:04) (136/51 - 177/78)  RR: 19 (04-24-23 @ 08:21) (18 - 19)  SpO2: 94% (04-24-23 @ 14:04) (92% - 94%)    PHYSICAL EXAM:  Gen: NAD  HEENT:  pupils equal and reactive, EOMI, no oropharyngeal lesions, erythema, exudates, oral thrush  NECK:   supple, no carotid bruits, no palpable lymph nodes, no thyromegaly  CV:  +S1, +S2, regular, no murmurs or rubs  RESP:   lungs clear to auscultation bilaterally, no wheezing, rales, rhonchi, good air entry bilaterally  BREAST:  not examined  GI:  abdomen soft, non-tender, non-distended, normal BS, no bruits, no abdominal masses, no palpable masses  RECTAL:  not examined  :  not examined  MSK:   normal muscle tone, no atrophy, no rigidity, no contractions  EXT:  no clubbing, no cyanosis, no edema, no calf pain, swelling or erythema  VASCULAR:  pulses equal and symmetric in the upper and lower extremities  NEURO:  AAOX1, no focal neurological deficits, follows all commands, able to move extremities spontaneously  SKIN:  no ulcers, lesions or rashes    BCx NG  UCx >3org    Additional results/Imaging, I have personally reviewed:  CT a/p w iv contrast, no oral contrast 4/20/23: No evidence for mechanical bowel obstruction. No colonic wall thickening. No free intraperitoneal air or fluid.    CXR 4/20/23:  1. Linear atelectasis in the left lower lobe is not present previously, while the remaining lungs are clear. 2. AV sequential pacemaker in situ with atherosclerotic aorta and no CHF, unchanged.    Echo 4/21/23: The left ventricle is normal in size, wall thickness, wall motion and contractility. Estimated left ventricular ejection fraction is 60-65 %. Normal appearing left atrium. The aortic valve is well visualized, appears mildly sclerotic. Valve opening seems to be normal. Mild mitral annular calcification is present. Moderate to severe (3+) mitral regurgitation is present. Normal appearing tricuspid valve structure. Moderate to severe (3+) tricuspid valve regurgitation is present. Normal appearing pulmonic valve structure. Trace pulmonic valvular regurgitation is present. The IVC has decreased respiratory variation.    #Severe sepsis w lactic acidosis  -likely GI source given sx  -UA neg  -BCx NG  -lactate elevated  -CXR neg  -CT a/p w iv contrast but w no oral contrast unremarkable  -Zosyn while inpt  -morphine for pain control    #Acute hypoxic resp failure  -likely pulmonary edema in setting of aggressive volume resuscitation  -O2 prn  -morphine for increased WOB    #Elevated troponin, likely demand ischemia  -trop now plateaud  -echo as above  -ASA, now stopped given given  -appreciate cards input    #HypoK  -likely GI losses  -repleted    #ERICA  -likely prerenal    #DVT ppx- SCDs    #GOC- DNR/DNI. Comfort care, for home hospice    I have spent 45 min on day of d/c coordinating care.

## 2023-04-24 NOTE — DISCHARGE NOTE PROVIDER - NSDCCPCAREPLAN_GEN_ALL_CORE_FT
PRINCIPAL DISCHARGE DIAGNOSIS  Diagnosis: Diarrheal disease  Assessment and Plan of Treatment: Take morphine as needed for pain, ativan as needed for anxiety/agitation and hyoscyamine for excess secretions.

## 2023-04-24 NOTE — DISCHARGE NOTE PROVIDER - NSDCMRMEDTOKEN_GEN_ALL_CORE_FT
cloNIDine 0.1 mg oral tablet: 1 tab(s) orally 3 times a day  escitalopram 10 mg oral tablet: 1 tab(s) orally once a day  hyoscyamine 0.125 mg oral tablet: 1 tab(s) orally every 6 hours as needed for secretions 1 tab(s) orally by mouth or under the tongue every 6 hours, As Needed for terminal/excessive secretions.  LORazepam 2 mg/mL oral concentrate: 0.5 milliliter(s) orally every 6 hours as needed for  anxiety Take 0.5 milliliter(s) orally every 6 hours, As Needed -for anxiety MDD: 2 mL  metoprolol succinate 100 mg oral tablet, extended release: 1 tab(s) orally once a day  mirtazapine 15 mg oral tablet: 1 tab(s) orally once a day (at bedtime)  morphine 20 mg/mL oral concentrate: 0.25 milliliter(s) orally every 6 hours as needed for pain or shortness of breath Place 0.25 milliliter(s) under tongue every 6 hours, As Needed for pain or shortness of breath MDD: 1 mL

## 2023-04-26 LAB
CULTURE RESULTS: SIGNIFICANT CHANGE UP
CULTURE RESULTS: SIGNIFICANT CHANGE UP
SPECIMEN SOURCE: SIGNIFICANT CHANGE UP
SPECIMEN SOURCE: SIGNIFICANT CHANGE UP

## 2023-04-28 DIAGNOSIS — J96.01 ACUTE RESPIRATORY FAILURE WITH HYPOXIA: ICD-10-CM

## 2023-04-28 DIAGNOSIS — E11.65 TYPE 2 DIABETES MELLITUS WITH HYPERGLYCEMIA: ICD-10-CM

## 2023-04-28 DIAGNOSIS — Z51.5 ENCOUNTER FOR PALLIATIVE CARE: ICD-10-CM

## 2023-04-28 DIAGNOSIS — N17.9 ACUTE KIDNEY FAILURE, UNSPECIFIED: ICD-10-CM

## 2023-04-28 DIAGNOSIS — E86.0 DEHYDRATION: ICD-10-CM

## 2023-04-28 DIAGNOSIS — R65.20 SEVERE SEPSIS WITHOUT SEPTIC SHOCK: ICD-10-CM

## 2023-04-28 DIAGNOSIS — R19.7 DIARRHEA, UNSPECIFIED: ICD-10-CM

## 2023-04-28 DIAGNOSIS — I24.8 OTHER FORMS OF ACUTE ISCHEMIC HEART DISEASE: ICD-10-CM

## 2023-04-28 DIAGNOSIS — Z66 DO NOT RESUSCITATE: ICD-10-CM

## 2023-04-28 DIAGNOSIS — E87.20 ACIDOSIS, UNSPECIFIED: ICD-10-CM

## 2023-04-28 DIAGNOSIS — E87.6 HYPOKALEMIA: ICD-10-CM

## 2023-04-28 DIAGNOSIS — A41.9 SEPSIS, UNSPECIFIED ORGANISM: ICD-10-CM

## 2024-01-06 NOTE — ED STATDOCS - DR. NAME
[facility]

## 2024-12-06 NOTE — ED STATDOCS - DR. NAME
[facility]     Department of Hospital Medicine     [address] [place] [address]     [phone] after hours  [phone] fax       NURSING HOME ORDERS    12/06/2024    Admit to Nursing Home:  Skilled Bed                                                 Diagnoses:  Active Hospital Problems    Diagnosis  POA    *Bilateral lower extremity edema and erythema, ulceration [R60.0]  Yes    Osteomyelitis of great toe of left foot [M86.9]  Yes    Type 2 diabetes mellitus with circulatory disorder, without long-term current use of insulin [E11.59]  Yes     Patient no longer requires medications for this.  Her last A1c was 5.8.   Check hemoglobin A1c every 12 months.      Chronic diastolic congestive heart failure [I50.32]  Yes     Two gram sodium diet.    Weight loss discussed.    Try to walk 2 miles per day.    Avoid smoking.    Current medications will be:  Lower Valsartan 320 mg to 160 mg daily   Lasix 40 mg daily   Lower Metoprolol 100 mg to 50 mg daily   Potassium 20 mEq daily      Chronic atrial fibrillation [I48.20]  Yes     Continue Eliquis 5 mg daily  Keep appointment with Cardiology      Hypothyroidism [E03.9]  Yes     Patient takes Synthroid 88 mcg for her hypothyroidism.  Adjust Synthroid based on TSH resutls.  Check TSH every 6 months.        Resolved Hospital Problems   No resolved problems to display.       Patient is homebound due to:  Bilateral lower extremity edema    Allergies:  Review of patient's allergies indicates:   Allergen Reactions    Cephalosporins Hives     Itchiness and hives immediately after IV Ceftriaxone push    Statins-hmg-coa reductase inhibitors      Other reaction(s): Unknown       Vitals:     Every shift (Skilled Nursing patients)    Diet: cardiac     Acitivities:     - As tolerated, per facility protocol and therapy recommendations    LABS:  Per facility protocol    Nursing Precautions:     - Fall precautions per nursing home  protocol   - Decubitus precautions:        -  for positioning   - Pressure reducing foam mattress   - Turn patient every two hours. Use wedge pillows to anchor patient    CONSULTS:      Physical Therapy to evaluate and treat     Occupational Therapy to evaluate and treat     Nutrition to evaluate and recommend diet    MISCELLANEOUS CARE:     Routine Skin for Bedridden Patients:  Apply moisture barrier cream to all    skin folds and wet areas in perineal area daily and after baths and                           all bowel movements.    WOUND CARE:      Other Wounds:    Vascular     Location:  Lower extremities     Assessment:  Bilateral lower legs with light erythema and small amount of edema.   Right lateral/posterior leg with linear area of discoloration- 5 cm(L) 1 cm(W) dark purple area without drainage or fluctuance. No surrounding induration. Patient reports legs were draining when sitting up in wheelchair.   Treatment/Plan:  Nursing to continue local wound care with betadine as per Podiatry orders. Continue PIP interventions with heels suspended off bed in EHOB boots. Avoid adhesives on lower legs. Continue to monitor legs for drainage. If right leg resumes draining, recommend applying hydrofiber dressing and secure with stockinette.         Medications: Discontinue all previous medication orders, if any. See new list below.       Medication List        CONTINUE taking these medications      acetaminophen 325 MG tablet  Commonly known as: TYLENOL  Take 2 tablets (650 mg total) by mouth every 8 (eight) hours as needed for Temperature greater than or Pain (or equal to 100 degree F. Not to exceed 4 grams daily).     albuterol 90 mcg/actuation inhaler  Commonly known as: PROVENTIL/VENTOLIN HFA  Inhale 2 puffs into the lungs every 4 (four) hours as needed for Wheezing or Shortness of Breath. Rescue     alendronate 70 MG tablet  Commonly known as: FOSAMAX  Take 1 tablet (70 mg total) by mouth every 7 days.  Resume on discharge from skilled nursing.     atorvastatin 20 MG tablet  Commonly known as: LIPITOR  Take 20 mg by mouth every evening.     calcium carbonate-vit D3-min 600 mg-10 mcg (400 unit) Tab  Take 2 tablets by mouth once daily.     colestipoL 1 gram Tab  Commonly known as: COLESTID  Take 3 tablets (3 g total) by mouth 2 (two) times daily. For cholesterol and diarrhea     D5W PgBk 100 mL with piperacillin-tazobactam 4.5 gram SolR 4.5 g  Inject 4.5 g into the vein every 8 (eight) hours. Ending on 12/17/2024.     diclofenac sodium 1 % Gel  Commonly known as: VOLTAREN  Apply 2 g topically 2 (two) times daily. Apply to bilateral knees     ELIQUIS 5 mg Tab  Generic drug: apixaban  Take 5 mg by mouth 2 (two) times daily.     furosemide 40 MG tablet  Commonly known as: LASIX  Take 1 tablet (40 mg total) by mouth once daily.     insulin lispro 100 unit/mL injection  Commonly known as: HumaLOG U-100 Insulin  Inject 0-5 Units into the skin every meal as needed for High Blood Sugar. Blood Glucose  mg/dL                  Pre-meal       151-200                1 units       201-250                2 units       251-300                3 units      301-350                4 units            >350                     5 units          Administer subcutaneously if needed at times designated by monitoring  schedule.     isosorbide mononitrate 60 MG 24 hr tablet  Commonly known as: IMDUR  Take 60 mg by mouth once daily.     lactobacillus acidophilus & bulgar 100 million cell packet  Commonly known as: LACTINEX  Take 1 packet (1 each total) by mouth 2 (two) times daily. May discontinue on 1/17/2025     levothyroxine 88 MCG tablet  Commonly known as: SYNTHROID  Take 1 tablet (88 mcg total) by mouth before breakfast.     loperamide 2 mg Tab  Commonly known as: IMODIUM A-D  Take 1 tablet (2 mg total) by mouth 3 (three) times daily as needed (diarrhea).     losartan 25 MG tablet  Commonly known as: COZAAR  Take 25 mg by mouth 2 (two)  Triana times a day.     metoprolol succinate 25 MG 24 hr tablet  Commonly known as: TOPROL-XL  Take 25 mg by mouth once daily.     miconazole NITRATE 2 % 2 % top powder  Commonly known as: MICOTIN  Apply topically 2 (two) times daily. To groin     nitroGLYCERIN 0.4 MG SL tablet  Commonly known as: NITROSTAT  Place 0.4 mg under the tongue every 5 (five) minutes as needed for Chest pain.     potassium chloride SA 20 MEQ tablet  Commonly known as: K-DUR,KLOR-CON  Take 1 tablet (20 mEq total) by mouth once daily.     PRESERVISION AREDS 4,296 mcg-226 mg-90 mg Cap  Generic drug: vitamins A,C,E-zinc-copper  Take 1 capsule by mouth Daily. Resume once discharged from skilled nursing.     spironolactone 25 MG tablet  Commonly known as: ALDACTONE  Take 25 mg by mouth every morning.     VISION ORAL  Take 1 tablet by mouth 2 (two) times daily.     VITAMIN B-12 1000 MCG tablet  Generic drug: cyanocobalamin  Take 100 mcg by mouth once daily.                    _________________________________  Mumtaz Escobar Jr, NP  12/06/2024

## 2025-04-23 NOTE — DISCHARGE NOTE NURSING/CASE MANAGEMENT/SOCIAL WORK - NSDPDISTO_GEN_ALL_CORE
[de-identified] : Neurologic: normal mood and affect, orientated and able to communicate Constitutional: well developed and well nourished  Left Knee: max tenderness fibular head Home with home care